# Patient Record
Sex: FEMALE | Race: WHITE | Employment: UNEMPLOYED | URBAN - METROPOLITAN AREA
[De-identification: names, ages, dates, MRNs, and addresses within clinical notes are randomized per-mention and may not be internally consistent; named-entity substitution may affect disease eponyms.]

---

## 2023-02-01 ENCOUNTER — HOSPITAL ENCOUNTER (EMERGENCY)
Age: 23
Discharge: HOME | End: 2023-02-01
Attending: EMERGENCY MEDICINE

## 2023-02-01 VITALS
RESPIRATION RATE: 18 BRPM | HEIGHT: 63 IN | TEMPERATURE: 97.8 F | OXYGEN SATURATION: 100 % | BODY MASS INDEX: 29.77 KG/M2 | HEART RATE: 73 BPM | DIASTOLIC BLOOD PRESSURE: 52 MMHG | WEIGHT: 168 LBS | SYSTOLIC BLOOD PRESSURE: 118 MMHG

## 2023-02-01 DIAGNOSIS — R19.7 NAUSEA VOMITING AND DIARRHEA: Primary | ICD-10-CM

## 2023-02-01 DIAGNOSIS — R11.2 NAUSEA VOMITING AND DIARRHEA: Primary | ICD-10-CM

## 2023-02-01 DIAGNOSIS — R10.9 ABDOMINAL PAIN, UNSPECIFIED ABDOMINAL LOCATION: ICD-10-CM

## 2023-02-01 LAB
ALBUMIN SERPL-MCNC: 4.3 G/DL (ref 3.5–4.6)
ALP BLD-CCNC: 86 U/L (ref 40–130)
ALT SERPL-CCNC: 30 U/L (ref 0–33)
AMORPHOUS: ABNORMAL
ANION GAP SERPL CALCULATED.3IONS-SCNC: 10 MEQ/L (ref 9–15)
AST SERPL-CCNC: 22 U/L (ref 0–35)
BACTERIA: ABNORMAL /HPF
BASOPHILS ABSOLUTE: 0.1 K/UL (ref 0–0.1)
BASOPHILS RELATIVE PERCENT: 0.6 % (ref 0.1–1.2)
BILIRUB SERPL-MCNC: <0.2 MG/DL (ref 0.2–0.7)
BILIRUBIN URINE: NEGATIVE
BLOOD, URINE: NEGATIVE
BUN BLDV-MCNC: 9 MG/DL (ref 6–20)
CALCIUM SERPL-MCNC: 9.3 MG/DL (ref 8.5–9.9)
CHLORIDE BLD-SCNC: 100 MEQ/L (ref 95–107)
CLARITY: NORMAL
CO2: 28 MEQ/L (ref 20–31)
COLOR: YELLOW
CREAT SERPL-MCNC: 0.66 MG/DL (ref 0.5–0.9)
EOSINOPHILS ABSOLUTE: 0.3 K/UL (ref 0–0.4)
EOSINOPHILS RELATIVE PERCENT: 3.3 % (ref 0.7–5.8)
EPITHELIAL CELLS, UA: ABNORMAL /HPF
GFR SERPL CREATININE-BSD FRML MDRD: >60 ML/MIN/{1.73_M2}
GLOBULIN: 3 G/DL (ref 2.3–3.5)
GLUCOSE BLD-MCNC: 93 MG/DL (ref 70–99)
GLUCOSE URINE: NEGATIVE MG/DL
HCG(URINE) PREGNANCY TEST: NEGATIVE
HCT VFR BLD CALC: 45.8 % (ref 37–47)
HEMOGLOBIN: 15 G/DL (ref 11.2–15.7)
IMMATURE GRANULOCYTES #: 0 K/UL
IMMATURE GRANULOCYTES %: 0.2 %
KETONES, URINE: NEGATIVE MG/DL
LEUKOCYTE ESTERASE, URINE: NORMAL
LIPASE: 25 U/L (ref 12–95)
LYMPHOCYTES ABSOLUTE: 2.9 K/UL (ref 1.2–3.7)
LYMPHOCYTES RELATIVE PERCENT: 29 %
MAGNESIUM: 1.9 MG/DL (ref 1.7–2.4)
MCH RBC QN AUTO: 28.5 PG (ref 25.6–32.2)
MCHC RBC AUTO-ENTMCNC: 32.8 % (ref 32.2–35.5)
MCV RBC AUTO: 87.1 FL (ref 79.4–94.8)
MONOCYTES ABSOLUTE: 0.5 K/UL (ref 0.2–0.9)
MONOCYTES RELATIVE PERCENT: 4.9 % (ref 4.7–12.5)
NEUTROPHILS ABSOLUTE: 6.3 K/UL (ref 1.6–6.1)
NEUTROPHILS RELATIVE PERCENT: 62 % (ref 34–71.1)
NITRITE, URINE: NEGATIVE
PDW BLD-RTO: 12.5 % (ref 11.7–14.4)
PH UA: 7.5 (ref 5–9)
PLATELET # BLD: 330 K/UL (ref 182–369)
POTASSIUM SERPL-SCNC: 3.9 MEQ/L (ref 3.4–4.9)
PROTEIN UA: NEGATIVE MG/DL
RBC # BLD: 5.26 M/UL (ref 3.93–5.22)
RBC UA: ABNORMAL /HPF (ref 0–2)
SARS-COV-2, NAAT: NOT DETECTED
SODIUM BLD-SCNC: 138 MEQ/L (ref 135–144)
SPECIFIC GRAVITY UA: 1.02 (ref 1–1.03)
TOTAL PROTEIN: 7.3 G/DL (ref 6.3–8)
TROPONIN: <0.01 NG/ML (ref 0–0.01)
UROBILINOGEN, URINE: 0.2 E.U./DL
WBC # BLD: 10.2 K/UL (ref 4–10)
WBC UA: ABNORMAL /HPF (ref 0–5)

## 2023-02-01 PROCEDURE — 84703 CHORIONIC GONADOTROPIN ASSAY: CPT

## 2023-02-01 PROCEDURE — 6360000002 HC RX W HCPCS: Performed by: EMERGENCY MEDICINE

## 2023-02-01 PROCEDURE — 2580000003 HC RX 258: Performed by: EMERGENCY MEDICINE

## 2023-02-01 PROCEDURE — 96374 THER/PROPH/DIAG INJ IV PUSH: CPT

## 2023-02-01 PROCEDURE — 36415 COLL VENOUS BLD VENIPUNCTURE: CPT

## 2023-02-01 PROCEDURE — 99284 EMERGENCY DEPT VISIT MOD MDM: CPT

## 2023-02-01 PROCEDURE — 2500000003 HC RX 250 WO HCPCS: Performed by: EMERGENCY MEDICINE

## 2023-02-01 PROCEDURE — 96375 TX/PRO/DX INJ NEW DRUG ADDON: CPT

## 2023-02-01 PROCEDURE — 83690 ASSAY OF LIPASE: CPT

## 2023-02-01 PROCEDURE — 85025 COMPLETE CBC W/AUTO DIFF WBC: CPT

## 2023-02-01 PROCEDURE — 83735 ASSAY OF MAGNESIUM: CPT

## 2023-02-01 PROCEDURE — 80053 COMPREHEN METABOLIC PANEL: CPT

## 2023-02-01 PROCEDURE — 87635 SARS-COV-2 COVID-19 AMP PRB: CPT

## 2023-02-01 PROCEDURE — 84484 ASSAY OF TROPONIN QUANT: CPT

## 2023-02-01 PROCEDURE — 81001 URINALYSIS AUTO W/SCOPE: CPT

## 2023-02-01 RX ORDER — 0.9 % SODIUM CHLORIDE 0.9 %
1000 INTRAVENOUS SOLUTION INTRAVENOUS ONCE
Status: COMPLETED | OUTPATIENT
Start: 2023-02-01 | End: 2023-02-01

## 2023-02-01 RX ORDER — KETOROLAC TROMETHAMINE 15 MG/ML
15 INJECTION, SOLUTION INTRAMUSCULAR; INTRAVENOUS ONCE
Status: COMPLETED | OUTPATIENT
Start: 2023-02-01 | End: 2023-02-01

## 2023-02-01 RX ORDER — DIPHENHYDRAMINE HYDROCHLORIDE 50 MG/ML
25 INJECTION INTRAMUSCULAR; INTRAVENOUS ONCE
Status: COMPLETED | OUTPATIENT
Start: 2023-02-01 | End: 2023-02-01

## 2023-02-01 RX ORDER — CIPROFLOXACIN 500 MG/1
500 TABLET, FILM COATED ORAL 2 TIMES DAILY
Qty: 10 TABLET | Refills: 0 | Status: SHIPPED | OUTPATIENT
Start: 2023-02-01 | End: 2023-02-06

## 2023-02-01 RX ORDER — METOCLOPRAMIDE 10 MG/1
10 TABLET ORAL 2 TIMES DAILY PRN
Qty: 60 TABLET | Refills: 0 | Status: SHIPPED | OUTPATIENT
Start: 2023-02-01

## 2023-02-01 RX ORDER — METOCLOPRAMIDE HYDROCHLORIDE 5 MG/ML
10 INJECTION INTRAMUSCULAR; INTRAVENOUS ONCE
Status: COMPLETED | OUTPATIENT
Start: 2023-02-01 | End: 2023-02-01

## 2023-02-01 RX ADMIN — Medication 20 MG: at 14:26

## 2023-02-01 RX ADMIN — SODIUM CHLORIDE 1000 ML: 9 INJECTION, SOLUTION INTRAVENOUS at 14:25

## 2023-02-01 RX ADMIN — DIPHENHYDRAMINE HYDROCHLORIDE 25 MG: 50 INJECTION, SOLUTION INTRAMUSCULAR; INTRAVENOUS at 14:27

## 2023-02-01 RX ADMIN — KETOROLAC TROMETHAMINE 15 MG: 15 INJECTION, SOLUTION INTRAMUSCULAR; INTRAVENOUS at 14:28

## 2023-02-01 RX ADMIN — METOCLOPRAMIDE 10 MG: 5 INJECTION, SOLUTION INTRAMUSCULAR; INTRAVENOUS at 14:31

## 2023-02-01 ASSESSMENT — ENCOUNTER SYMPTOMS
ABDOMINAL PAIN: 1
VOMITING: 1
COUGH: 0
DIARRHEA: 1
BACK PAIN: 0
SHORTNESS OF BREATH: 0
NAUSEA: 1
SORE THROAT: 0

## 2023-02-01 ASSESSMENT — PAIN DESCRIPTION - ORIENTATION: ORIENTATION: LOWER;MID

## 2023-02-01 ASSESSMENT — PAIN SCALES - GENERAL
PAINLEVEL_OUTOF10: 0
PAINLEVEL_OUTOF10: 4
PAINLEVEL_OUTOF10: 5

## 2023-02-01 ASSESSMENT — PAIN DESCRIPTION - LOCATION
LOCATION: ABDOMEN
LOCATION: ABDOMEN

## 2023-02-01 ASSESSMENT — PAIN - FUNCTIONAL ASSESSMENT
PAIN_FUNCTIONAL_ASSESSMENT: 0-10
PAIN_FUNCTIONAL_ASSESSMENT: 0-10

## 2023-02-01 ASSESSMENT — PAIN DESCRIPTION - DESCRIPTORS
DESCRIPTORS: CRAMPING
DESCRIPTORS: CRAMPING

## 2023-02-01 NOTE — ED PROVIDER NOTES
[facility] ED  eMERGENCYdEPARTMENT eNCOUnter      Pt Name: Kwame Starks  MRN: [id number]  [place] 2000  Date of evaluation: 2/1/2023  Tanesha Rouse MD    CHIEF COMPLAINT           HPI  Kwame Satrks is a 25 y.o. female per chart review has no pmh presents to the ED with n/v/d, ab pain. Pt notes gradual onset, mild, intermittent, aching, epigastric ab pain x 3 days. +N/v/d. Pt denies fever, cp, sob, dysuria. ROS  Review of Systems   Constitutional:  Negative for activity change, chills and fever. HENT:  Negative for ear pain and sore throat. Eyes:  Negative for visual disturbance. Respiratory:  Negative for cough and shortness of breath. Cardiovascular:  Negative for chest pain, palpitations and leg swelling. Gastrointestinal:  Positive for abdominal pain, diarrhea, nausea and vomiting. Genitourinary:  Negative for dysuria. Musculoskeletal:  Negative for back pain. Skin:  Negative for rash. Neurological:  Negative for dizziness and weakness. Except as noted above the remainder of the review of systems was reviewed and negative. PAST MEDICAL HISTORY     Past Medical History:   Diagnosis Date    Stomach ulcer     UTI (urinary tract infection)          SURGICAL HISTORY     No past surgical history on file. CURRENTMEDICATIONS       Previous Medications    No medications on file       ALLERGIES     Blueberry    FAMILY HISTORY     No family history on file. SOCIAL HISTORY       Social History     Socioeconomic History    Marital status: Single   Tobacco Use    Smoking status: Every Day     Types: Cigarettes    Smokeless tobacco: Never   Substance and Sexual Activity    Alcohol use: Not Currently         PHYSICAL EXAM       ED Triage Vitals [02/01/23 1406]   BP Temp Temp Source Heart Rate Resp SpO2 Height Weight   -- 97.8 °F (36.6 °C) Temporal 83 20 99 % 5' 3\" (1.6 m) 168 lb (76.2 kg)       Physical Exam  Vitals and nursing note reviewed. Constitutional:       Appearance: She is well-developed. HENT:      Head: Normocephalic. Right Ear: External ear normal.      Left Ear: External ear normal.   Eyes:      Conjunctiva/sclera: Conjunctivae normal.      Pupils: Pupils are equal, round, and reactive to light. Cardiovascular:      Rate and Rhythm: Normal rate and regular rhythm. Heart sounds: Normal heart sounds. Pulmonary:      Effort: Pulmonary effort is normal.      Breath sounds: Normal breath sounds. Abdominal:      General: Bowel sounds are normal. There is no distension. Palpations: Abdomen is soft. Tenderness: There is no abdominal tenderness. Musculoskeletal:         General: Normal range of motion. Cervical back: Normal range of motion and neck supple. Skin:     General: Skin is warm and dry. Neurological:      Mental Status: She is alert and oriented to person, place, and time. Psychiatric:         Mood and Affect: Mood normal.         MDM  24 yo female presents to the ED with ab pain, n/v/d. Pt is afebrile, hemodynamically stable. Pt given 1  L NS, IV reglan, IV benadryl, IV pepcid, IV toradol in the ED. Labs remarkable for WBC 10.  Pt reassessed and feels much better. Covid, flu negative. UA, hcg negative. Pt given prescription for reglan, cipro. Will treat for bacterial gastroenteritis. Pt given ab pain, n/v/d warning signs and will f/uw with pcp. FINAL IMPRESSION      1. Nausea vomiting and diarrhea    2.  Abdominal pain, unspecified abdominal location          DISPOSITION/PLAN   DISPOSITION Decision To Discharge 02/01/2023 03:11:53 PM        DISCHARGE MEDICATIONS:  [unfilled]         Gerry Bolton MD(electronically signed)  Attending Emergency Physician            Gerry Bolton MD  02/01/23 7350

## 2023-05-19 ENCOUNTER — HOSPITAL ENCOUNTER (EMERGENCY)
Age: 23
Discharge: HOME | End: 2023-05-19
Attending: EMERGENCY MEDICINE
Payer: COMMERCIAL

## 2023-05-19 VITALS
DIASTOLIC BLOOD PRESSURE: 75 MMHG | HEIGHT: 63 IN | BODY MASS INDEX: 29.23 KG/M2 | TEMPERATURE: 97.6 F | RESPIRATION RATE: 16 BRPM | SYSTOLIC BLOOD PRESSURE: 132 MMHG | HEART RATE: 97 BPM | WEIGHT: 165 LBS | OXYGEN SATURATION: 98 %

## 2023-05-19 DIAGNOSIS — R09.81 NASAL CONGESTION: Primary | ICD-10-CM

## 2023-05-19 DIAGNOSIS — H92.02 OTALGIA OF LEFT EAR: ICD-10-CM

## 2023-05-19 PROCEDURE — 99283 EMERGENCY DEPT VISIT LOW MDM: CPT

## 2023-05-19 RX ORDER — AZITHROMYCIN 250 MG/1
TABLET, FILM COATED ORAL
Qty: 1 PACKET | Refills: 0 | Status: SHIPPED | OUTPATIENT
Start: 2023-05-19 | End: 2023-05-23

## 2023-05-19 RX ORDER — GUAIFENESIN AND DEXTROMETHORPHAN HYDROBROMIDE 1200; 60 MG/1; MG/1
1 TABLET, EXTENDED RELEASE ORAL 2 TIMES DAILY
Qty: 20 TABLET | Refills: 0 | Status: SHIPPED | OUTPATIENT
Start: 2023-05-19

## 2023-05-19 ASSESSMENT — ENCOUNTER SYMPTOMS
TROUBLE SWALLOWING: 0
EYE REDNESS: 0
FACIAL SWELLING: 0
CHOKING: 0
SHORTNESS OF BREATH: 0
ABDOMINAL PAIN: 0
CONSTIPATION: 0
VOMITING: 0
WHEEZING: 0
STRIDOR: 0
DIARRHEA: 0
BACK PAIN: 0
CHEST TIGHTNESS: 0
SINUS PRESSURE: 1
SORE THROAT: 0
EYE DISCHARGE: 0
SINUS PAIN: 1
BLOOD IN STOOL: 0
COUGH: 1
EYE PAIN: 0
VOICE CHANGE: 0

## 2023-05-19 NOTE — ED PROVIDER NOTES
[facility] ED  eMERGENCY dEPARTMENT eNCOUnter    Name: Pedrito Moreland  MRN: [id number]  [place] 2000  Date of evaluation: 5/19/2023  Provider: Bryce Lui MD    [address] [place] [address]       Chief Complaint   Patient presents with    Cough    Nasal Congestion    Ear Fullness     TORY OF PRESENT ILLNESS   (Location/Symptom, Timing/Onset,Context/Setting, Quality, Duration, Modifying Factors, Severity)  Note limiting factors. Pedrito Moreland is a 25 y.o. female who presents to the emergency department patient come to the respiratory illness for the last 3 to 4 days time getting worse nasal congestion sinus drainage pressure in the ear along with cold cough congestion no one sick at home at this time patient denies short of breath or chest pain smoker patient admits to using a Q-tip few days ago no bleeding hearing slightly muffled very sick at home with respiratory illness at home as per   HPI    NursingNotes were reviewed. REVIEW OF SYSTEMS    (2-9 systems for level 4, 10 or more for level 5)     Review of Systems   Constitutional:  Positive for appetite change, chills and diaphoresis. Negative for activity change and fever. HENT:  Positive for congestion, ear pain, hearing loss, postnasal drip, sinus pressure and sinus pain. Negative for drooling, facial swelling, mouth sores, nosebleeds, sore throat, trouble swallowing and voice change. Eyes:  Negative for pain, discharge, redness and visual disturbance. Respiratory:  Positive for cough. Negative for choking, chest tightness, shortness of breath, wheezing and stridor. Cardiovascular:  Negative for chest pain, palpitations and leg swelling. Gastrointestinal:  Negative for abdominal pain, blood in stool, constipation, diarrhea and vomiting. Endocrine: Negative for cold intolerance, polyphagia and polyuria. Genitourinary:  Negative for dysuria, flank pain, frequency, genital sores and urgency.    Musculoskeletal:  Negative for back

## 2023-05-19 NOTE — ED TRIAGE NOTES
Pt a/ox3 skinw d intact  lung sounds clear pulse stromng and regular  pt has dry non productive cough  with nasal congestion  no distress noted

## 2023-10-15 ENCOUNTER — APPOINTMENT (OUTPATIENT)
Dept: GENERAL RADIOLOGY | Age: 23
End: 2023-10-15

## 2023-10-15 ENCOUNTER — HOSPITAL ENCOUNTER (EMERGENCY)
Age: 23
Discharge: HOME | End: 2023-10-15

## 2023-10-15 VITALS
DIASTOLIC BLOOD PRESSURE: 58 MMHG | SYSTOLIC BLOOD PRESSURE: 119 MMHG | RESPIRATION RATE: 18 BRPM | OXYGEN SATURATION: 98 % | HEART RATE: 65 BPM

## 2023-10-15 DIAGNOSIS — M54.2 NECK PAIN: Primary | ICD-10-CM

## 2023-10-15 DIAGNOSIS — M43.6 TORTICOLLIS: ICD-10-CM

## 2023-10-15 PROCEDURE — 99283 EMERGENCY DEPT VISIT LOW MDM: CPT

## 2023-10-15 PROCEDURE — 72050 X-RAY EXAM NECK SPINE 4/5VWS: CPT

## 2023-10-15 PROCEDURE — 6370000000 HC RX 637 (ALT 250 FOR IP): Performed by: NURSE PRACTITIONER

## 2023-10-15 RX ORDER — OLANZAPINE 2.5 MG/1
2.5 TABLET ORAL NIGHTLY
COMMUNITY

## 2023-10-15 RX ORDER — OXYCODONE HYDROCHLORIDE AND ACETAMINOPHEN 5; 325 MG/1; MG/1
1 TABLET ORAL ONCE
Status: COMPLETED | OUTPATIENT
Start: 2023-10-15 | End: 2023-10-15

## 2023-10-15 RX ORDER — CYCLOBENZAPRINE HCL 10 MG
10 TABLET ORAL 3 TIMES DAILY PRN
Qty: 21 TABLET | Refills: 0 | Status: SHIPPED | OUTPATIENT
Start: 2023-10-15 | End: 2023-10-25

## 2023-10-15 RX ORDER — CYCLOBENZAPRINE HCL 10 MG
10 TABLET ORAL ONCE
Status: COMPLETED | OUTPATIENT
Start: 2023-10-15 | End: 2023-10-15

## 2023-10-15 RX ORDER — HYDROCODONE BITARTRATE AND ACETAMINOPHEN 5; 325 MG/1; MG/1
1 TABLET ORAL EVERY 4 HOURS PRN
Qty: 18 TABLET | Refills: 0 | Status: SHIPPED | OUTPATIENT
Start: 2023-10-15 | End: 2023-10-18

## 2023-10-15 RX ORDER — FLUOXETINE HYDROCHLORIDE 20 MG/1
60 CAPSULE ORAL DAILY
COMMUNITY

## 2023-10-15 RX ADMIN — OXYCODONE AND ACETAMINOPHEN 1 TABLET: 5; 325 TABLET ORAL at 16:08

## 2023-10-15 RX ADMIN — CYCLOBENZAPRINE 10 MG: 10 TABLET, FILM COATED ORAL at 16:08

## 2023-10-15 ASSESSMENT — ENCOUNTER SYMPTOMS
VOMITING: 0
FACIAL SWELLING: 0
DIARRHEA: 0
SINUS PAIN: 0
EYE ITCHING: 0
EYE PAIN: 0
COLOR CHANGE: 0
ALLERGIC/IMMUNOLOGIC NEGATIVE: 1
NAUSEA: 0
SORE THROAT: 0
EYE DISCHARGE: 0
APNEA: 0
ABDOMINAL DISTENTION: 0
EYE REDNESS: 0
CONSTIPATION: 0
COUGH: 0
BLOOD IN STOOL: 0
PHOTOPHOBIA: 0
CHEST TIGHTNESS: 0
WHEEZING: 0
ABDOMINAL PAIN: 0
VOICE CHANGE: 0
STRIDOR: 0
TROUBLE SWALLOWING: 0
CHOKING: 0
BACK PAIN: 0
SINUS PRESSURE: 0
SHORTNESS OF BREATH: 0
RHINORRHEA: 0

## 2023-10-15 NOTE — ED PROVIDER NOTES
[facility] ED  EMERGENCY DEPARTMENT ENCOUNTER      Pt Name: Catherine Espana  MRN: [id number]  [address] [facility] 2000  Date of evaluation: 10/15/2023  Provider: MATHEUS Lund CNP    CHIEF COMPLAINT       Chief Complaint   Patient presents with    Neck Injury     Turned head too quickly about 1-2 hours ago, neck pain on right side and tingling sensation down right shoulder and arm         HISTORY OF PRESENT ILLNESS   (Location/Symptom, Timing/Onset, Context/Setting, Quality, Duration, Modifying Factors, Severity)  Note limiting factors. Catherine Espana is a 25 y.o. female who, per chart review, has past medical history of asthma, stomach ulcer, UTIs presents to the emergency department with c/o sudden onset, constant, severe, aching, shooting pain in R side of her neck x 2 hours. Pt reports she turned her head quickly to look at something and immediately felt pain in the R side of her neck. States pain is at the base of her neck and radiates to R shoulder. Pt denies chest pain, shortness of breath, palpitations, fevers, abdominal pain, nausea, vomiting, diarrhea, dysuria, hematuria, flank pain, incontinence. Nursing Notes were reviewed. REVIEW OF SYSTEMS    (2-9 systems for level 4, 10 or more for level 5)     Review of Systems   Constitutional:  Negative for chills, diaphoresis, fatigue and fever. HENT:  Negative for congestion, dental problem, drooling, ear discharge, ear pain, facial swelling, hearing loss, mouth sores, nosebleeds, postnasal drip, rhinorrhea, sinus pressure, sinus pain, sneezing, sore throat, tinnitus, trouble swallowing and voice change. Eyes:  Negative for photophobia, pain, discharge, redness, itching and visual disturbance. Respiratory:  Negative for apnea, cough, choking, chest tightness, shortness of breath, wheezing and stridor. Cardiovascular:  Negative for chest pain, palpitations and leg swelling.    Gastrointestinal:  Negative for abdominal

## 2023-11-14 ENCOUNTER — HOSPITAL ENCOUNTER (EMERGENCY)
Age: 23
Discharge: HOME | End: 2023-11-14

## 2023-11-14 VITALS
HEART RATE: 84 BPM | BODY MASS INDEX: 30.11 KG/M2 | HEIGHT: 64 IN | RESPIRATION RATE: 20 BRPM | TEMPERATURE: 98.3 F | DIASTOLIC BLOOD PRESSURE: 75 MMHG | SYSTOLIC BLOOD PRESSURE: 141 MMHG | OXYGEN SATURATION: 99 % | WEIGHT: 176.37 LBS

## 2023-11-14 DIAGNOSIS — Z32.01 POSITIVE URINE PREGNANCY TEST: Primary | ICD-10-CM

## 2023-11-14 LAB
BILIRUB UR QL STRIP: NEGATIVE
CLARITY UR: NORMAL
COLOR UR: YELLOW
GLUCOSE UR STRIP-MCNC: NEGATIVE MG/DL
HCG UR QL: POSITIVE
HGB UR QL STRIP: NEGATIVE
KETONES UR STRIP-MCNC: NORMAL MG/DL
LEUKOCYTE ESTERASE UR QL STRIP: NEGATIVE
NITRITE UR QL STRIP: NEGATIVE
PH UR STRIP: 6.5 [PH] (ref 5–9)
PROT UR STRIP-MCNC: NORMAL MG/DL
SP GR UR STRIP: 1.02 (ref 1–1.03)
URINE REFLEX TO CULTURE: NORMAL
UROBILINOGEN UR STRIP-ACNC: 0.2 E.U./DL

## 2023-11-14 PROCEDURE — 81003 URINALYSIS AUTO W/O SCOPE: CPT

## 2023-11-14 PROCEDURE — 99283 EMERGENCY DEPT VISIT LOW MDM: CPT

## 2023-11-14 PROCEDURE — 84703 CHORIONIC GONADOTROPIN ASSAY: CPT

## 2023-11-14 ASSESSMENT — LIFESTYLE VARIABLES
HOW MANY STANDARD DRINKS CONTAINING ALCOHOL DO YOU HAVE ON A TYPICAL DAY: PATIENT DOES NOT DRINK
HOW OFTEN DO YOU HAVE A DRINK CONTAINING ALCOHOL: NEVER

## 2023-11-14 ASSESSMENT — ENCOUNTER SYMPTOMS
COUGH: 0
SORE THROAT: 0
ABDOMINAL PAIN: 0
BACK PAIN: 0
SHORTNESS OF BREATH: 0
DIARRHEA: 0
NAUSEA: 0
VOMITING: 0

## 2023-11-14 ASSESSMENT — PAIN - FUNCTIONAL ASSESSMENT
PAIN_FUNCTIONAL_ASSESSMENT: NONE - DENIES PAIN
PAIN_FUNCTIONAL_ASSESSMENT: NONE - DENIES PAIN

## 2023-11-14 NOTE — ED TRIAGE NOTES
Patient in and states she wants  a pregnacy test and states she took 3 at home that state she is pregnant

## 2023-11-14 NOTE — ED PROVIDER NOTES
[facility] ED  eMERGENCYdEPARTMENT eNCOUnter      Pt Name: Davie Yoo  MRN: [id number]  [facility] 2000[facility] evaluation: 11/14/2023  Provider:MATHEUS Da Silva CNP    CHIEF COMPLAINT       Chief Complaint   Patient presents with    Pregnancy Test     Just tested today, tested positive 3 times on at home tests. HISTORY OF PRESENT ILLNESS  (Location/Symptom, Timing/Onset, Context/Setting, Quality, Duration, Modifying Factors, Severity.)   Davie Yoo is a 25 y.o. female hx of UTI, Asthma,  who presents to the emergency department for pregnancy test.  Presents to the emergency department with concern for pregnancy. Her last period was 1 month ago and she states she has regular periods every month. She states she was due to start her period this week so she took a home pregnancy test it was positive so she took 2 more that were also positive. She presents to emergency department for confirmation of pregnancy. This would be her first pregnancy. She denies any pelvic pain discharge or vaginal bleeding. She denies any chest pain, shortness of breath, abdominal pain, nausea, vomiting, diarrhea, fever, chills, headache, or recent illness. She is not on any form of birth control. HPI    Nursing Notes were reviewed and I agree. REVIEW OF SYSTEMS    (2-9 systems for level 4, 10 or more for level 5)     Review of Systems   Constitutional:  Negative for activity change, chills and fever. HENT:  Negative for ear pain and sore throat. Eyes:  Negative for visual disturbance. Respiratory:  Negative for cough and shortness of breath. Cardiovascular:  Negative for chest pain, palpitations and leg swelling. Gastrointestinal:  Negative for abdominal pain, diarrhea, nausea and vomiting. Genitourinary:  Negative for dysuria. Musculoskeletal:  Negative for back pain. Skin:  Negative for rash. Neurological:  Negative for dizziness and weakness.         as noted above the

## 2023-11-14 NOTE — DISCHARGE INSTRUCTIONS
Call your prescribing doctor to discuss your Zyprexa and Prozac medication as you tested positive for pregnancy today. Please schedule an OB/GYN appointment referral phone number given above. Start taking prenatal vitamin. Return to emergency department for any new or worsening symptoms.

## 2023-11-28 ENCOUNTER — APPOINTMENT (OUTPATIENT)
Dept: ULTRASOUND IMAGING | Age: 23
End: 2023-11-28

## 2023-11-28 ENCOUNTER — HOSPITAL ENCOUNTER (EMERGENCY)
Age: 23
Discharge: HOME | End: 2023-11-28
Attending: EMERGENCY MEDICINE

## 2023-11-28 VITALS
BODY MASS INDEX: 31.01 KG/M2 | RESPIRATION RATE: 17 BRPM | TEMPERATURE: 98.2 F | OXYGEN SATURATION: 98 % | HEART RATE: 64 BPM | DIASTOLIC BLOOD PRESSURE: 64 MMHG | WEIGHT: 175 LBS | SYSTOLIC BLOOD PRESSURE: 128 MMHG | HEIGHT: 63 IN

## 2023-11-28 DIAGNOSIS — O21.0 MILD HYPEREMESIS GRAVIDARUM, ANTEPARTUM: Primary | ICD-10-CM

## 2023-11-28 DIAGNOSIS — O23.41 UTI IN PREGNANCY, ANTEPARTUM, FIRST TRIMESTER: ICD-10-CM

## 2023-11-28 LAB
ALBUMIN SERPL-MCNC: 4.4 G/DL (ref 3.5–4.6)
ALP SERPL-CCNC: 100 U/L (ref 40–130)
ALT SERPL-CCNC: 19 U/L (ref 0–33)
ANION GAP SERPL CALCULATED.3IONS-SCNC: 13 MEQ/L (ref 9–15)
AST SERPL-CCNC: 15 U/L (ref 0–35)
BACTERIA URNS QL MICRO: ABNORMAL /HPF
BASOPHILS # BLD: 0.1 K/UL (ref 0–0.1)
BASOPHILS NFR BLD: 0.3 % (ref 0.1–1.2)
BILIRUB SERPL-MCNC: <0.2 MG/DL (ref 0.2–0.7)
BILIRUB UR QL STRIP: ABNORMAL
BUN SERPL-MCNC: 6 MG/DL (ref 6–20)
CALCIUM SERPL-MCNC: 9.9 MG/DL (ref 8.5–9.9)
CHLORIDE SERPL-SCNC: 104 MEQ/L (ref 95–107)
CLARITY UR: CLEAR
CO2 SERPL-SCNC: 20 MEQ/L (ref 20–31)
COLOR UR: YELLOW
CREAT SERPL-MCNC: 0.54 MG/DL (ref 0.5–0.9)
EOSINOPHIL # BLD: 0.1 K/UL (ref 0–0.4)
EOSINOPHIL NFR BLD: 0.4 % (ref 0.7–5.8)
EPI CELLS #/AREA URNS HPF: ABNORMAL /HPF
ERYTHROCYTE [DISTWIDTH] IN BLOOD BY AUTOMATED COUNT: 13.1 % (ref 11.7–14.4)
GLOBULIN SER CALC-MCNC: 3.4 G/DL (ref 2.3–3.5)
GLUCOSE SERPL-MCNC: 101 MG/DL (ref 70–99)
GLUCOSE UR STRIP-MCNC: NEGATIVE MG/DL
GONADOTROPIN, CHORIONIC (HCG) QUANT: NORMAL MIU/ML
HCG UR QL: POSITIVE
HCT VFR BLD AUTO: 42.9 % (ref 37–47)
HGB BLD-MCNC: 14.1 G/DL (ref 11.2–15.7)
HGB UR QL STRIP: NEGATIVE
IMM GRANULOCYTES # BLD: 0 K/UL
IMM GRANULOCYTES NFR BLD: 0.3 %
INFLUENZA A BY PCR: NEGATIVE
INFLUENZA B BY PCR: NEGATIVE
KETONES UR STRIP-MCNC: 80 MG/DL
LEUKOCYTE ESTERASE UR QL STRIP: NEGATIVE
LIPASE SERPL-CCNC: 17 U/L (ref 12–95)
LYMPHOCYTES # BLD: 2.4 K/UL (ref 1.2–3.7)
LYMPHOCYTES NFR BLD: 15.9 %
MCH RBC QN AUTO: 28.7 PG (ref 25.6–32.2)
MCHC RBC AUTO-ENTMCNC: 32.9 % (ref 32.2–35.5)
MCV RBC AUTO: 87.4 FL (ref 79.4–94.8)
MONOCYTES # BLD: 0.6 K/UL (ref 0.2–0.9)
MONOCYTES NFR BLD: 4.3 % (ref 4.7–12.5)
MUCOUS THREADS URNS QL MICRO: PRESENT /LPF
NEUTROPHILS # BLD: 11.6 K/UL (ref 1.6–6.1)
NEUTS SEG NFR BLD: 78.8 % (ref 34–71.1)
NITRITE UR QL STRIP: NEGATIVE
PH UR STRIP: 6.5 [PH] (ref 5–9)
PLATELET # BLD AUTO: 309 K/UL (ref 182–369)
POTASSIUM SERPL-SCNC: 3.9 MEQ/L (ref 3.4–4.9)
PROT SERPL-MCNC: 7.8 G/DL (ref 6.3–8)
PROT UR STRIP-MCNC: 100 MG/DL
RBC # BLD AUTO: 4.91 M/UL (ref 3.93–5.22)
RBC #/AREA URNS HPF: ABNORMAL /HPF (ref 0–2)
SARS-COV-2 RDRP RESP QL NAA+PROBE: NOT DETECTED
SODIUM SERPL-SCNC: 137 MEQ/L (ref 135–144)
SP GR UR STRIP: >=1.03 (ref 1–1.03)
UROBILINOGEN UR STRIP-ACNC: 0.2 E.U./DL
WBC # BLD AUTO: 14.8 K/UL (ref 4–10)
WBC #/AREA URNS HPF: ABNORMAL /HPF (ref 0–5)

## 2023-11-28 PROCEDURE — 36415 COLL VENOUS BLD VENIPUNCTURE: CPT

## 2023-11-28 PROCEDURE — 83690 ASSAY OF LIPASE: CPT

## 2023-11-28 PROCEDURE — 87502 INFLUENZA DNA AMP PROBE: CPT

## 2023-11-28 PROCEDURE — 87086 URINE CULTURE/COLONY COUNT: CPT

## 2023-11-28 PROCEDURE — 80053 COMPREHEN METABOLIC PANEL: CPT

## 2023-11-28 PROCEDURE — 84702 CHORIONIC GONADOTROPIN TEST: CPT

## 2023-11-28 PROCEDURE — 85025 COMPLETE CBC W/AUTO DIFF WBC: CPT

## 2023-11-28 PROCEDURE — 6370000000 HC RX 637 (ALT 250 FOR IP): Performed by: EMERGENCY MEDICINE

## 2023-11-28 PROCEDURE — 99284 EMERGENCY DEPT VISIT MOD MDM: CPT

## 2023-11-28 PROCEDURE — 2580000003 HC RX 258: Performed by: EMERGENCY MEDICINE

## 2023-11-28 PROCEDURE — 84703 CHORIONIC GONADOTROPIN ASSAY: CPT

## 2023-11-28 PROCEDURE — 81001 URINALYSIS AUTO W/SCOPE: CPT

## 2023-11-28 PROCEDURE — 87635 SARS-COV-2 COVID-19 AMP PRB: CPT

## 2023-11-28 PROCEDURE — 76817 TRANSVAGINAL US OBSTETRIC: CPT

## 2023-11-28 RX ORDER — CEPHALEXIN 500 MG/1
500 CAPSULE ORAL 4 TIMES DAILY
Qty: 40 CAPSULE | Refills: 0 | Status: SHIPPED | OUTPATIENT
Start: 2023-11-28 | End: 2023-12-08

## 2023-11-28 RX ORDER — ONDANSETRON 4 MG/1
4 TABLET, FILM COATED ORAL EVERY 8 HOURS PRN
Qty: 20 TABLET | Refills: 0 | Status: SHIPPED | OUTPATIENT
Start: 2023-11-28

## 2023-11-28 RX ORDER — ONDANSETRON 4 MG/1
4 TABLET, ORALLY DISINTEGRATING ORAL ONCE
Status: COMPLETED | OUTPATIENT
Start: 2023-11-28 | End: 2023-11-28

## 2023-11-28 RX ORDER — ACETAMINOPHEN 500 MG
1000 TABLET ORAL
Status: COMPLETED | OUTPATIENT
Start: 2023-11-28 | End: 2023-11-28

## 2023-11-28 RX ORDER — ACETAMINOPHEN 500 MG
500 TABLET ORAL 4 TIMES DAILY PRN
Qty: 50 TABLET | Refills: 0 | Status: SHIPPED | OUTPATIENT
Start: 2023-11-28

## 2023-11-28 RX ORDER — 0.9 % SODIUM CHLORIDE 0.9 %
1000 INTRAVENOUS SOLUTION INTRAVENOUS ONCE
Status: COMPLETED | OUTPATIENT
Start: 2023-11-28 | End: 2023-11-28

## 2023-11-28 RX ADMIN — ACETAMINOPHEN 1000 MG: 500 TABLET ORAL at 19:15

## 2023-11-28 RX ADMIN — ONDANSETRON 4 MG: 4 TABLET, ORALLY DISINTEGRATING ORAL at 19:16

## 2023-11-28 RX ADMIN — SODIUM CHLORIDE 1000 ML: 9 INJECTION, SOLUTION INTRAVENOUS at 19:45

## 2023-11-28 ASSESSMENT — ENCOUNTER SYMPTOMS
CONSTIPATION: 0
BACK PAIN: 0
ABDOMINAL PAIN: 1
RHINORRHEA: 0
APNEA: 0
COUGH: 0
EYE PAIN: 0
VOMITING: 1
SORE THROAT: 0
SHORTNESS OF BREATH: 0
SINUS PRESSURE: 0
WHEEZING: 0
DIARRHEA: 0
NAUSEA: 1
COLOR CHANGE: 0
ABDOMINAL DISTENTION: 0
PHOTOPHOBIA: 0

## 2023-11-28 ASSESSMENT — PAIN - FUNCTIONAL ASSESSMENT: PAIN_FUNCTIONAL_ASSESSMENT: 0-10

## 2023-11-28 ASSESSMENT — PAIN SCALES - GENERAL
PAINLEVEL_OUTOF10: 2
PAINLEVEL_OUTOF10: 4

## 2023-11-28 ASSESSMENT — PAIN DESCRIPTION - PAIN TYPE: TYPE: ACUTE PAIN

## 2023-11-28 ASSESSMENT — PAIN DESCRIPTION - DESCRIPTORS
DESCRIPTORS: ACHING
DESCRIPTORS: SORE

## 2023-11-28 ASSESSMENT — PAIN DESCRIPTION - LOCATION
LOCATION: ABDOMEN
LOCATION: ABDOMEN

## 2023-11-29 NOTE — ED PROVIDER NOTES
[facility] ED  eMERGENCY dEPARTMENT eNCOUnter      Pt Name: Cherelle Clancy  MRN: [id number]  [address] [facility] [place] 2000  Date of evaluation: 11/28/2023  Provider: Sarita Estrada MD    CHIEF COMPLAINT       Chief Complaint   Patient presents with    Emesis     Nausea/vomiting, a few weeks pregnant. Sharp lower abd pain while vomiting. HISTORY OF PRESENT ILLNESS   (Location/Symptom, Timing/Onset,Context/Setting, Quality, Duration, Modifying Factors, Severity)  Note limiting factors. Cherelle Clancy is a 25 y.o. female who presents to the emergency department with complaint of nausea and vomiting of 3 days duration. Also lower abdominal pain with episodes of emesis. This is her second pregnancy. She is 5 weeks pregnant by home pregnancy test.  She had a miscarriage that was ectopic last year. Denies fever or chills. Denies dysuria. Denies any other systemic symptoms. HPI    Nursing Notes were reviewed. REVIEW OF SYSTEMS    (2-9 systems for level 4, 10 or more for level 5)     Review of Systems   Constitutional: Negative. Negative for activity change, appetite change, chills, fatigue and fever. HENT:  Negative for congestion, ear discharge, ear pain, hearing loss, rhinorrhea, sinus pressure and sore throat. Eyes:  Negative for photophobia, pain and visual disturbance. Respiratory:  Negative for apnea, cough, shortness of breath and wheezing. Cardiovascular:  Negative for chest pain, palpitations and leg swelling. Gastrointestinal:  Positive for abdominal pain, nausea and vomiting. Negative for abdominal distention, constipation and diarrhea. Endocrine: Negative for cold intolerance, heat intolerance and polyuria. Genitourinary:  Negative for dysuria, flank pain, frequency and urgency. Musculoskeletal:  Negative for arthralgias, back pain, gait problem, myalgias and neck stiffness. Skin:  Negative for color change, pallor and rash.    Allergic/Immunologic: Negative for food

## 2023-11-29 NOTE — ED TRIAGE NOTES
Pt presents to ER with c/o 5-6 weeks pregnant with vomiting over the past few days with lower abdominal pain. Pt states she does not have an OB at this time.  LMP 10/11/23

## 2023-11-30 LAB — BACTERIA UR CULT: NORMAL

## 2024-01-26 ENCOUNTER — APPOINTMENT (OUTPATIENT)
Dept: CT IMAGING | Age: 24
End: 2024-01-26
Payer: MEDICAID

## 2024-01-26 ENCOUNTER — HOSPITAL ENCOUNTER (EMERGENCY)
Age: 24
Discharge: HOME | End: 2024-01-27
Attending: EMERGENCY MEDICINE
Payer: MEDICAID

## 2024-01-26 DIAGNOSIS — M54.2 NECK PAIN: Primary | ICD-10-CM

## 2024-01-26 PROCEDURE — 99285 EMERGENCY DEPT VISIT HI MDM: CPT

## 2024-01-26 PROCEDURE — 81001 URINALYSIS AUTO W/SCOPE: CPT

## 2024-01-26 PROCEDURE — 72125 CT NECK SPINE W/O DYE: CPT

## 2024-01-26 PROCEDURE — 6360000004 HC RX CONTRAST MEDICATION: Performed by: EMERGENCY MEDICINE

## 2024-01-26 PROCEDURE — 70491 CT SOFT TISSUE NECK W/DYE: CPT

## 2024-01-26 PROCEDURE — 36415 COLL VENOUS BLD VENIPUNCTURE: CPT

## 2024-01-26 PROCEDURE — 85025 COMPLETE CBC W/AUTO DIFF WBC: CPT

## 2024-01-26 PROCEDURE — 2580000003 HC RX 258: Performed by: EMERGENCY MEDICINE

## 2024-01-26 PROCEDURE — 80048 BASIC METABOLIC PNL TOTAL CA: CPT

## 2024-01-26 RX ORDER — 0.9 % SODIUM CHLORIDE 0.9 %
1000 INTRAVENOUS SOLUTION INTRAVENOUS ONCE
Status: COMPLETED | OUTPATIENT
Start: 2024-01-26 | End: 2024-01-27

## 2024-01-26 RX ADMIN — SODIUM CHLORIDE 1000 ML: 9 INJECTION, SOLUTION INTRAVENOUS at 23:53

## 2024-01-26 ASSESSMENT — PAIN - FUNCTIONAL ASSESSMENT: PAIN_FUNCTIONAL_ASSESSMENT: NONE - DENIES PAIN

## 2024-01-27 VITALS
DIASTOLIC BLOOD PRESSURE: 59 MMHG | HEART RATE: 77 BPM | WEIGHT: 165 LBS | OXYGEN SATURATION: 97 % | TEMPERATURE: 98 F | RESPIRATION RATE: 16 BRPM | BODY MASS INDEX: 29.23 KG/M2 | SYSTOLIC BLOOD PRESSURE: 97 MMHG | HEIGHT: 63 IN

## 2024-01-27 LAB
ANION GAP SERPL CALCULATED.3IONS-SCNC: 11 MEQ/L (ref 9–15)
BASE EXCESS VENOUS: -4 (ref -3–3)
BASOPHILS # BLD: 0 K/UL (ref 0–0.2)
BASOPHILS NFR BLD: 0.3 %
BILIRUB UR QL STRIP: NEGATIVE
BUN SERPL-MCNC: 8 MG/DL (ref 6–20)
CALCIUM IONIZED: 1.18 MMOL/L (ref 1.12–1.32)
CALCIUM SERPL-MCNC: 9 MG/DL (ref 8.5–9.9)
CHLORIDE SERPL-SCNC: 104 MEQ/L (ref 95–107)
CLARITY UR: ABNORMAL
CO2 SERPL-SCNC: 20 MEQ/L (ref 20–31)
COLOR UR: YELLOW
CREAT SERPL-MCNC: 0.58 MG/DL (ref 0.5–0.9)
CRYSTALS URNS MICRO: ABNORMAL /HPF
EOSINOPHIL # BLD: 0.2 K/UL (ref 0–0.7)
EOSINOPHIL NFR BLD: 2.5 %
ERYTHROCYTE [DISTWIDTH] IN BLOOD BY AUTOMATED COUNT: 13.2 % (ref 11.5–14.5)
GLUCOSE BLD-MCNC: 84 MG/DL (ref 70–99)
GLUCOSE SERPL-MCNC: 94 MG/DL (ref 70–99)
GLUCOSE UR STRIP-MCNC: NEGATIVE MG/DL
HCO3 VENOUS: 20.6 MMOL/L (ref 23–29)
HCT VFR BLD AUTO: 32 % (ref 36–48)
HCT VFR BLD AUTO: 34.4 % (ref 37–47)
HGB BLD CALC-MCNC: 11 GM/DL (ref 12–16)
HGB BLD-MCNC: 11.5 G/DL (ref 12–16)
HGB UR QL STRIP: NEGATIVE
HYALINE CASTS #/AREA URNS LPF: ABNORMAL /LPF (ref 0–5)
KETONES UR STRIP-MCNC: ABNORMAL MG/DL
LEUKOCYTE ESTERASE UR QL STRIP: NEGATIVE
LYMPHOCYTES # BLD: 2.9 K/UL (ref 1–4.8)
LYMPHOCYTES NFR BLD: 29.8 %
MCH RBC QN AUTO: 29.1 PG (ref 27–31.3)
MCHC RBC AUTO-ENTMCNC: 33.4 % (ref 33–37)
MCV RBC AUTO: 87.1 FL (ref 79.4–94.8)
MONOCYTES # BLD: 0.4 K/UL (ref 0.2–0.8)
MONOCYTES NFR BLD: 4.6 %
NEUTROPHILS # BLD: 6 K/UL (ref 1.4–6.5)
NEUTS SEG NFR BLD: 62.5 %
NITRITE UR QL STRIP: NEGATIVE
O2 SAT, VEN: 85 %
PCO2, VEN: 32 MM HG (ref 40–50)
PERFORMED ON: ABNORMAL
PH UR STRIP: 6 [PH] (ref 5–9)
PH VENOUS: 7.42 (ref 7.32–7.42)
PLATELET # BLD AUTO: 242 K/UL (ref 130–400)
PO2, VEN: 49 MM HG
POC CHLORIDE: 107 MEQ/L (ref 99–110)
POC CREATININE: 0.5 MG/DL (ref 0.6–1.2)
POC SAMPLE TYPE: ABNORMAL
POTASSIUM SERPL-SCNC: 3.8 MEQ/L (ref 3.4–4.9)
POTASSIUM SERPL-SCNC: 3.8 MEQ/L (ref 3.5–5.1)
PROT UR STRIP-MCNC: 30 MG/DL
RBC # BLD AUTO: 3.95 M/UL (ref 4.2–5.4)
RBC #/AREA URNS HPF: ABNORMAL /HPF (ref 0–2)
SODIUM BLD-SCNC: 138 MEQ/L (ref 136–145)
SODIUM SERPL-SCNC: 135 MEQ/L (ref 135–144)
SP GR UR STRIP: 1.03 (ref 1–1.03)
TCO2 CALC VENOUS: 22 MMOL/L
URINE REFLEX TO CULTURE: ABNORMAL
UROBILINOGEN UR STRIP-ACNC: 1 E.U./DL
WBC # BLD AUTO: 9.6 K/UL (ref 4.8–10.8)
WBC #/AREA URNS HPF: ABNORMAL /HPF (ref 0–5)

## 2024-01-27 PROCEDURE — 84295 ASSAY OF SERUM SODIUM: CPT

## 2024-01-27 PROCEDURE — 6360000004 HC RX CONTRAST MEDICATION: Performed by: EMERGENCY MEDICINE

## 2024-01-27 PROCEDURE — 82565 ASSAY OF CREATININE: CPT

## 2024-01-27 PROCEDURE — 82435 ASSAY OF BLOOD CHLORIDE: CPT

## 2024-01-27 PROCEDURE — 82330 ASSAY OF CALCIUM: CPT

## 2024-01-27 PROCEDURE — 82803 BLOOD GASES ANY COMBINATION: CPT

## 2024-01-27 PROCEDURE — 85014 HEMATOCRIT: CPT

## 2024-01-27 PROCEDURE — 84132 ASSAY OF SERUM POTASSIUM: CPT

## 2024-01-27 RX ADMIN — IOPAMIDOL 75 ML: 755 INJECTION, SOLUTION INTRAVENOUS at 00:34

## 2024-01-27 NOTE — ED PROVIDER NOTES
[facility] ED  EMERGENCY DEPARTMENT ENCOUNTER      Pt Name: Wendi Gomez  MRN: [id number]  Birthdate 2000  Date of evaluation: 1/26/2024  Provider: ABDIRAHMAN CAMARA DO    CHIEF COMPLAINT       Chief Complaint   Patient presents with    Neck Injury     Tripped over a toy and hit right side of neck on corner of a table - states that she became dizzy and may have passed out         HISTORY OF PRESENT ILLNESS   (Location/Symptom, Timing/Onset, Context/Setting, Quality, Duration, Modifying Factors, Severity)  Note limiting factors.   Wendi Gomez is a 23 y.o. female who presents to the emergency department with neck pain after hitting the corner of the table.      The history is provided by the patient.   Neck Pain  Pain location:  R side  Quality:  Aching      Nursing Notes were reviewed.    REVIEW OF SYSTEMS    (2-9 systems for level 4, 10 or more for level 5)     Review of Systems   Musculoskeletal:  Positive for neck pain.       Except as noted above the remainder of the review of systems was reviewed and negative.       PAST MEDICAL HISTORY     Past Medical History:   Diagnosis Date    Asthma     Stomach ulcer     UTI (urinary tract infection)          SURGICAL HISTORY     History reviewed. No pertinent surgical history.      CURRENT MEDICATIONS       Previous Medications    ACETAMINOPHEN (TYLENOL) 500 MG TABLET    Take 1 tablet by mouth 4 times daily as needed for Pain    DEXTROMETHORPHAN-GUAIFENESIN (MUCINEX DM MAXIMUM STRENGTH)  MG TB12    Take 1 tablet by mouth in the morning and at bedtime    FLUOXETINE (PROZAC) 20 MG CAPSULE    Take 3 capsules by mouth daily    METOCLOPRAMIDE (REGLAN) 10 MG TABLET    Take 1 tablet by mouth 2 times daily as needed (Nausea/vomiting)    OLANZAPINE (ZYPREXA) 2.5 MG TABLET    Take 1 tablet by mouth nightly    ONDANSETRON (ZOFRAN) 4 MG TABLET    Take 1 tablet by mouth every 8 hours as needed for Nausea       ALLERGIES     Blueberry    FAMILY HISTORY     No

## 2024-01-31 ASSESSMENT — ENCOUNTER SYMPTOMS
BACK PAIN: 0
ABDOMINAL PAIN: 0
COUGH: 0
EYE REDNESS: 0
VOMITING: 0
NAUSEA: 0
EYE DISCHARGE: 0
SHORTNESS OF BREATH: 0
BOWEL INCONTINENCE: 0
SORE THROAT: 0

## 2024-02-09 ENCOUNTER — HOSPITAL ENCOUNTER (OUTPATIENT)
Dept: ULTRASOUND IMAGING | Age: 24
End: 2024-02-09
Payer: MEDICAID

## 2024-02-09 DIAGNOSIS — O09.892 HISTORY OF MATERNAL SYPHILIS, CURRENTLY PREGNANT, SECOND TRIMESTER: ICD-10-CM

## 2024-02-09 PROCEDURE — 76817 TRANSVAGINAL US OBSTETRIC: CPT

## 2024-02-09 PROCEDURE — 76805 OB US >/= 14 WKS SNGL FETUS: CPT

## 2024-02-22 ENCOUNTER — HOSPITAL ENCOUNTER (EMERGENCY)
Age: 24
Discharge: HOME | End: 2024-02-22
Payer: MEDICAID

## 2024-02-22 VITALS
RESPIRATION RATE: 20 BRPM | BODY MASS INDEX: 29.23 KG/M2 | HEIGHT: 63 IN | DIASTOLIC BLOOD PRESSURE: 70 MMHG | TEMPERATURE: 97.8 F | SYSTOLIC BLOOD PRESSURE: 92 MMHG | HEART RATE: 98 BPM | WEIGHT: 165 LBS | OXYGEN SATURATION: 100 %

## 2024-02-22 DIAGNOSIS — R82.71 BACTERIA IN URINE: ICD-10-CM

## 2024-02-22 DIAGNOSIS — M79.18 MUSCULOSKELETAL PAIN: Primary | ICD-10-CM

## 2024-02-22 LAB
AMORPH SED URNS QL MICRO: ABNORMAL
BACTERIA URNS QL MICRO: ABNORMAL /HPF
BILIRUB UR QL STRIP: ABNORMAL
CLARITY UR: ABNORMAL
COLOR UR: YELLOW
EPI CELLS #/AREA URNS HPF: ABNORMAL /HPF
GLUCOSE UR STRIP-MCNC: NEGATIVE MG/DL
HGB UR QL STRIP: NEGATIVE
KETONES UR STRIP-MCNC: NEGATIVE MG/DL
LEUKOCYTE ESTERASE UR QL STRIP: NEGATIVE
MUCOUS THREADS URNS QL MICRO: PRESENT /LPF
NITRITE UR QL STRIP: NEGATIVE
PH UR STRIP: 6 [PH] (ref 5–9)
PROT UR STRIP-MCNC: 30 MG/DL
RBC #/AREA URNS HPF: ABNORMAL /HPF (ref 0–2)
SP GR UR STRIP: >=1.03 (ref 1–1.03)
URINE REFLEX TO CULTURE: ABNORMAL
UROBILINOGEN UR STRIP-ACNC: 0.2 E.U./DL
WBC #/AREA URNS HPF: ABNORMAL /HPF (ref 0–5)

## 2024-02-22 PROCEDURE — 99283 EMERGENCY DEPT VISIT LOW MDM: CPT

## 2024-02-22 PROCEDURE — 6370000000 HC RX 637 (ALT 250 FOR IP): Performed by: NURSE PRACTITIONER

## 2024-02-22 PROCEDURE — 81001 URINALYSIS AUTO W/SCOPE: CPT

## 2024-02-22 RX ORDER — CEPHALEXIN 500 MG/1
500 CAPSULE ORAL ONCE
Status: COMPLETED | OUTPATIENT
Start: 2024-02-22 | End: 2024-02-22

## 2024-02-22 RX ORDER — CEPHALEXIN 500 MG/1
500 CAPSULE ORAL 3 TIMES DAILY
Qty: 21 CAPSULE | Refills: 0 | Status: SHIPPED | OUTPATIENT
Start: 2024-02-22 | End: 2024-02-29

## 2024-02-22 RX ADMIN — CEPHALEXIN 500 MG: 500 CAPSULE ORAL at 21:24

## 2024-02-22 ASSESSMENT — PAIN DESCRIPTION - LOCATION: LOCATION: ABDOMEN

## 2024-02-22 ASSESSMENT — PAIN - FUNCTIONAL ASSESSMENT
PAIN_FUNCTIONAL_ASSESSMENT: NONE - DENIES PAIN
PAIN_FUNCTIONAL_ASSESSMENT: ACTIVITIES ARE NOT PREVENTED
PAIN_FUNCTIONAL_ASSESSMENT: 0-10

## 2024-02-22 ASSESSMENT — PAIN DESCRIPTION - ONSET: ONSET: ON-GOING

## 2024-02-22 ASSESSMENT — PAIN DESCRIPTION - ORIENTATION: ORIENTATION: RIGHT;LOWER

## 2024-02-22 ASSESSMENT — LIFESTYLE VARIABLES
HOW OFTEN DO YOU HAVE A DRINK CONTAINING ALCOHOL: NEVER
HOW MANY STANDARD DRINKS CONTAINING ALCOHOL DO YOU HAVE ON A TYPICAL DAY: PATIENT DOES NOT DRINK

## 2024-02-22 ASSESSMENT — PAIN DESCRIPTION - DESCRIPTORS: DESCRIPTORS: SHARP

## 2024-02-22 ASSESSMENT — PAIN DESCRIPTION - PAIN TYPE: TYPE: ACUTE PAIN

## 2024-02-22 ASSESSMENT — PAIN DESCRIPTION - FREQUENCY: FREQUENCY: CONTINUOUS

## 2024-02-22 ASSESSMENT — PAIN SCALES - GENERAL: PAINLEVEL_OUTOF10: 7

## 2024-02-23 ASSESSMENT — ENCOUNTER SYMPTOMS
VOMITING: 0
SORE THROAT: 0
TROUBLE SWALLOWING: 0
BACK PAIN: 0
ABDOMINAL DISTENTION: 0
FACIAL SWELLING: 0
EYE ITCHING: 0
PHOTOPHOBIA: 0
EYE PAIN: 0
SINUS PAIN: 0
CONSTIPATION: 0
EYE DISCHARGE: 0
APNEA: 0
STRIDOR: 0
CHEST TIGHTNESS: 0
WHEEZING: 0
DIARRHEA: 0
CHOKING: 0
ALLERGIC/IMMUNOLOGIC NEGATIVE: 1
VOICE CHANGE: 0
COUGH: 0
BLOOD IN STOOL: 0
ABDOMINAL PAIN: 1
COLOR CHANGE: 0
NAUSEA: 0
EYE REDNESS: 0
RHINORRHEA: 0
SHORTNESS OF BREATH: 0
SINUS PRESSURE: 0

## 2024-02-23 NOTE — DISCHARGE INSTR - COC
Continuity of Care Form    Patient Name: Wendi Gomez   :  2000  MRN:  [id number]    Admit date:  2024  Discharge date:  ***    Code Status Order: No Order   Advance Directives:     Admitting Physician:  No admitting provider for patient encounter.  PCP: No primary care provider on file.    Discharging Nurse: ***  Discharging Hospital Unit/Room#: 10/10  Discharging Unit Phone Number: ***    Emergency Contact:   Extended Emergency Contact Information  Primary Emergency Contact: Slime [employer]  Home Phone: [phone]  Relation: Parent  Secondary Emergency Contact: Joby Lux  Address: [address] [place] [id number] [facility]  Home Phone: [phone]  Relation: Boyfriend    Past Surgical History:  No past surgical history on file.    Immunization History:     There is no immunization history on file for this patient.    Active Problems:  There is no problem list on file for this patient.      Isolation/Infection:   Isolation            No Isolation          Patient Infection Status       None to display                     Nurse Assessment:  Last Vital Signs: BP 92/70   Pulse 98   Temp 97.8 °F (36.6 °C) (Oral)   Resp 20   Ht 1.6 m (5' 3\")   Wt 74.8 kg (165 lb)   LMP 10/11/2023   SpO2 100%   BMI 29.23 kg/m²     Last documented pain score (0-10 scale): Pain Level: 7  Last Weight:   Wt Readings from Last 1 Encounters:   24 74.8 kg (165 lb)     Mental Status:  {IP PT MENTAL STATUS:62025}    IV Access:  { TAYLOR IV ACCESS:379092445}    Nursing Mobility/ADLs:  Walking   {CHP DME ADLs:655579229}  Transfer  {CHP DME ADLs:679581541}  Bathing  {CHP DME ADLs:974625619}  Dressing  {CHP DME ADLs:005590856}  Toileting  {CHP DME ADLs:968452272}  Feeding  {CHP DME ADLs:618111900}  Med Admin  {CHP DME ADLs:819920710}  Med Delivery   { TAYLOR MED Delivery:808862568}    Wound Care Documentation and Therapy:        Elimination:  Continence:   Bowel: {YES /

## 2024-02-23 NOTE — ED PROVIDER NOTES
[facility] ED  EMERGENCY DEPARTMENT ENCOUNTER      Pt Name: Wendi Gomez  MRN: [id number]  Birthdate 2000  Date of evaluation: 2/22/2024  Provider: MATHEUS Colorado CNP    CHIEF COMPLAINT       Chief Complaint   Patient presents with    Abdominal Pain     RLQ pain; 20 weeks pregnant; still has appendix; onset today; prior miscarriage     Emesis         HISTORY OF PRESENT ILLNESS   (Location/Symptom, Timing/Onset, Context/Setting, Quality, Duration, Modifying Factors, Severity)  Note limiting factors.   Wendi Gomez is a 23 y.o. female who, per chart review, has past medical history of asthma, ulcers, UTIs presents to the emergency department with c/o sudden onset, sharp, stabbing RLQ abd pain that radiates around to lower back and down her R leg.  Pt states she was sweeping the floor and doing heavy lifting at work when the pain started. States she is approximately 20 weeks pregnant and just wants to make sure everything is ok with the baby.  She denies vaginal bleeding, discharge, chest pain, shortness of breath, palpitations, fevers, nausea, vomiting, diarrhea, dysuria, hematuria, flank pain, incontinence.  She did not take any medications today for her symptoms.  She states pain is progressively getting better since it started.      Nursing Notes were reviewed.    REVIEW OF SYSTEMS    (2-9 systems for level 4, 10 or more for level 5)     Review of Systems   Constitutional:  Negative for chills, diaphoresis, fatigue and fever.   HENT:  Negative for congestion, dental problem, drooling, ear discharge, ear pain, facial swelling, hearing loss, mouth sores, nosebleeds, postnasal drip, rhinorrhea, sinus pressure, sinus pain, sneezing, sore throat, tinnitus, trouble swallowing and voice change.    Eyes:  Negative for photophobia, pain, discharge, redness, itching and visual disturbance.   Respiratory:  Negative for apnea, cough, choking, chest tightness, shortness of breath, wheezing and

## 2024-04-10 ENCOUNTER — HOSPITAL ENCOUNTER (EMERGENCY)
Age: 24
Discharge: HOME | End: 2024-04-10
Attending: EMERGENCY MEDICINE
Payer: MEDICAID

## 2024-04-10 VITALS
DIASTOLIC BLOOD PRESSURE: 72 MMHG | TEMPERATURE: 97.7 F | OXYGEN SATURATION: 100 % | WEIGHT: 175 LBS | SYSTOLIC BLOOD PRESSURE: 122 MMHG | HEART RATE: 61 BPM | HEIGHT: 63 IN | BODY MASS INDEX: 31.01 KG/M2 | RESPIRATION RATE: 16 BRPM

## 2024-04-10 DIAGNOSIS — O21.0 HYPEREMESIS GRAVIDARUM: Primary | ICD-10-CM

## 2024-04-10 LAB
ALBUMIN SERPL-MCNC: 3.8 G/DL (ref 3.5–4.6)
ALP SERPL-CCNC: 125 U/L (ref 40–130)
ALT SERPL-CCNC: 8 U/L (ref 0–33)
ANION GAP SERPL CALCULATED.3IONS-SCNC: 15 MEQ/L (ref 9–15)
AST SERPL-CCNC: 12 U/L (ref 0–35)
BACTERIA URNS QL MICRO: ABNORMAL /HPF
BASOPHILS # BLD: 0 K/UL (ref 0–0.1)
BASOPHILS NFR BLD: 0.2 % (ref 0.1–1.2)
BILIRUB SERPL-MCNC: <0.2 MG/DL (ref 0.2–0.7)
BILIRUB UR QL STRIP: ABNORMAL
BUN SERPL-MCNC: 7 MG/DL (ref 6–20)
CALCIUM SERPL-MCNC: 9.3 MG/DL (ref 8.5–9.9)
CHLORIDE SERPL-SCNC: 101 MEQ/L (ref 95–107)
CLARITY UR: CLEAR
CO2 SERPL-SCNC: 20 MEQ/L (ref 20–31)
COLOR UR: ABNORMAL
CREAT SERPL-MCNC: 0.54 MG/DL (ref 0.5–0.9)
EOSINOPHIL # BLD: 0.2 K/UL (ref 0–0.4)
EOSINOPHIL NFR BLD: 1.1 % (ref 0.7–5.8)
EPI CELLS #/AREA URNS HPF: ABNORMAL /HPF
ERYTHROCYTE [DISTWIDTH] IN BLOOD BY AUTOMATED COUNT: 13.5 % (ref 11.7–14.4)
GLOBULIN SER CALC-MCNC: 3.5 G/DL (ref 2.3–3.5)
GLUCOSE SERPL-MCNC: 94 MG/DL (ref 70–99)
GLUCOSE UR STRIP-MCNC: NEGATIVE MG/DL
HCT VFR BLD AUTO: 34.9 % (ref 37–47)
HGB BLD-MCNC: 11.9 G/DL (ref 11.2–15.7)
HGB UR QL STRIP: NEGATIVE
IMM GRANULOCYTES # BLD: 0.1 K/UL
IMM GRANULOCYTES NFR BLD: 0.5 %
INFLUENZA A BY PCR: NEGATIVE
INFLUENZA B BY PCR: NEGATIVE
KETONES UR STRIP-MCNC: 15 MG/DL
LEUKOCYTE ESTERASE UR QL STRIP: ABNORMAL
LIPASE SERPL-CCNC: 19 U/L (ref 12–95)
LYMPHOCYTES # BLD: 1.9 K/UL (ref 1.2–3.7)
LYMPHOCYTES NFR BLD: 13.9 %
MCH RBC QN AUTO: 30.1 PG (ref 25.6–32.2)
MCHC RBC AUTO-ENTMCNC: 34.1 % (ref 32.2–35.5)
MCV RBC AUTO: 88.1 FL (ref 79.4–94.8)
MONOCYTES # BLD: 0.5 K/UL (ref 0.2–0.9)
MONOCYTES NFR BLD: 3.7 % (ref 4.7–12.5)
NEUTROPHILS # BLD: 11.1 K/UL (ref 1.6–6.1)
NEUTS SEG NFR BLD: 80.6 % (ref 34–71.1)
NITRITE UR QL STRIP: NEGATIVE
PH UR STRIP: 7 [PH] (ref 5–9)
PLATELET # BLD AUTO: 277 K/UL (ref 182–369)
POTASSIUM SERPL-SCNC: 3.6 MEQ/L (ref 3.4–4.9)
PROT SERPL-MCNC: 7.3 G/DL (ref 6.3–8)
PROT UR STRIP-MCNC: 30 MG/DL
RBC # BLD AUTO: 3.96 M/UL (ref 3.93–5.22)
RBC #/AREA URNS HPF: ABNORMAL /HPF (ref 0–2)
SODIUM SERPL-SCNC: 136 MEQ/L (ref 135–144)
SP GR UR STRIP: 1.02 (ref 1–1.03)
UROBILINOGEN UR STRIP-ACNC: 1 E.U./DL
WBC # BLD AUTO: 13.8 K/UL (ref 4–10)
WBC #/AREA URNS HPF: ABNORMAL /HPF (ref 0–5)

## 2024-04-10 PROCEDURE — 83690 ASSAY OF LIPASE: CPT

## 2024-04-10 PROCEDURE — 96361 HYDRATE IV INFUSION ADD-ON: CPT

## 2024-04-10 PROCEDURE — 81001 URINALYSIS AUTO W/SCOPE: CPT

## 2024-04-10 PROCEDURE — 2580000003 HC RX 258: Performed by: EMERGENCY MEDICINE

## 2024-04-10 PROCEDURE — 80053 COMPREHEN METABOLIC PANEL: CPT

## 2024-04-10 PROCEDURE — 87502 INFLUENZA DNA AMP PROBE: CPT

## 2024-04-10 PROCEDURE — 99284 EMERGENCY DEPT VISIT MOD MDM: CPT

## 2024-04-10 PROCEDURE — 6360000002 HC RX W HCPCS: Performed by: EMERGENCY MEDICINE

## 2024-04-10 PROCEDURE — 96374 THER/PROPH/DIAG INJ IV PUSH: CPT

## 2024-04-10 PROCEDURE — 85025 COMPLETE CBC W/AUTO DIFF WBC: CPT

## 2024-04-10 PROCEDURE — 2580000003 HC RX 258

## 2024-04-10 PROCEDURE — 36415 COLL VENOUS BLD VENIPUNCTURE: CPT

## 2024-04-10 PROCEDURE — 99283 EMERGENCY DEPT VISIT LOW MDM: CPT

## 2024-04-10 RX ORDER — 0.9 % SODIUM CHLORIDE 0.9 %
2000 INTRAVENOUS SOLUTION INTRAVENOUS ONCE
Status: COMPLETED | OUTPATIENT
Start: 2024-04-10 | End: 2024-04-10

## 2024-04-10 RX ORDER — SODIUM CHLORIDE 9 MG/ML
INJECTION, SOLUTION INTRAVENOUS
Status: COMPLETED
Start: 2024-04-10 | End: 2024-04-10

## 2024-04-10 RX ORDER — METOCLOPRAMIDE HYDROCHLORIDE 5 MG/ML
10 INJECTION INTRAMUSCULAR; INTRAVENOUS ONCE
Status: COMPLETED | OUTPATIENT
Start: 2024-04-10 | End: 2024-04-10

## 2024-04-10 RX ADMIN — SODIUM CHLORIDE 2000 ML: 9 INJECTION, SOLUTION INTRAVENOUS at 10:47

## 2024-04-10 RX ADMIN — METOCLOPRAMIDE HYDROCHLORIDE 10 MG: 5 INJECTION INTRAMUSCULAR; INTRAVENOUS at 10:47

## 2024-04-10 RX ADMIN — SODIUM CHLORIDE 50 ML: 9 INJECTION, SOLUTION INTRAVENOUS at 10:47

## 2024-04-10 ASSESSMENT — PAIN DESCRIPTION - PAIN TYPE: TYPE: ACUTE PAIN

## 2024-04-10 ASSESSMENT — PAIN DESCRIPTION - DESCRIPTORS: DESCRIPTORS: ACHING

## 2024-04-10 ASSESSMENT — PAIN - FUNCTIONAL ASSESSMENT
PAIN_FUNCTIONAL_ASSESSMENT: PREVENTS OR INTERFERES SOME ACTIVE ACTIVITIES AND ADLS
PAIN_FUNCTIONAL_ASSESSMENT: NONE - DENIES PAIN
PAIN_FUNCTIONAL_ASSESSMENT: NONE - DENIES PAIN
PAIN_FUNCTIONAL_ASSESSMENT: 0-10

## 2024-04-10 ASSESSMENT — PAIN SCALES - GENERAL
PAINLEVEL_OUTOF10: 5
PAINLEVEL_OUTOF10: 0
PAINLEVEL_OUTOF10: 0

## 2024-04-10 ASSESSMENT — PAIN DESCRIPTION - LOCATION: LOCATION: ABDOMEN;RIB CAGE

## 2024-04-10 ASSESSMENT — PAIN DESCRIPTION - ORIENTATION: ORIENTATION: RIGHT

## 2024-04-10 NOTE — ED NOTES
Discharge instructions reviewed with patient.  No questions at this time.  Follow up discussed. Patient has OB appointment this Friday.  Patient ambulatory in stable condition with significant other at discharge.

## 2024-04-10 NOTE — ED TRIAGE NOTES
Patient states she can't keep anything down x 3 days. Feeling lightheaded. She is 27 weeks pregnant,

## 2024-04-10 NOTE — ED PROVIDER NOTES
[facility] ED  EMERGENCY DEPARTMENT ENCOUNTER      Pt Name: Wendi Gomez  MRN: [id number]  Birthdate 2000  Date of evaluation: 4/10/2024  Provider: Sami Osorio MD  Time Note started  10:27 AM EDT  4/10/24           NURSING NOTES REVIEWED     Pt evaluated in a timely manner      CURRENT MEDICATIONS       Discharge Medication List as of 4/10/2024 12:50 PM        CONTINUE these medications which have NOT CHANGED    Details   ondansetron (ZOFRAN) 4 MG tablet Take 1 tablet by mouth every 8 hours as needed for Nausea, Disp-20 tablet, R-0Normal      acetaminophen (TYLENOL) 500 MG tablet Take 1 tablet by mouth 4 times daily as needed for Pain, Disp-50 tablet, R-0Normal      OLANZapine (ZYPREXA) 2.5 MG tablet Take 1 tablet by mouth nightlyHistorical Med      FLUoxetine (PROZAC) 20 MG capsule Take 3 capsules by mouth dailyHistorical Med      Dextromethorphan-guaiFENesin (MUCINEX DM MAXIMUM STRENGTH)  MG TB12 Take 1 tablet by mouth in the morning and at bedtime, Disp-20 tablet, R-0Print      metoclopramide (REGLAN) 10 MG tablet Take 1 tablet by mouth 2 times daily as needed (Nausea/vomiting), Disp-60 tablet, R-0Print             ALLERGIES     Blueberry    FAMILY HISTORY     History reviewed. No pertinent family history.       SOCIAL HISTORY       Social History     Socioeconomic History    Marital status: Single     Spouse name: None    Number of children: None    Years of education: None    Highest education level: None   Tobacco Use    Smoking status: Every Day     Current packs/day: 0.50     Types: Cigarettes    Smokeless tobacco: Never   Substance and Sexual Activity    Alcohol use: Not Currently    Drug use: Yes     Types: Marijuana (Weed)    Sexual activity: Yes     Partners: Male         Social determinants of health impacting treatment or disposition: Patient does not have a family physician of record-provided with healthcare resources (patient does not need however have an obstetrician

## 2024-04-24 ENCOUNTER — HOSPITAL ENCOUNTER (INPATIENT)
Age: 24
LOS: 4 days | Discharge: HOME | DRG: 566 | End: 2024-04-29
Attending: STUDENT IN AN ORGANIZED HEALTH CARE EDUCATION/TRAINING PROGRAM | Admitting: PSYCHIATRY & NEUROLOGY
Payer: MEDICAID

## 2024-04-24 DIAGNOSIS — F32.A DEPRESSION, UNSPECIFIED DEPRESSION TYPE: Primary | ICD-10-CM

## 2024-04-24 LAB
ALBUMIN SERPL-MCNC: 3.9 G/DL (ref 3.5–4.6)
ALP SERPL-CCNC: 111 U/L (ref 40–130)
ALT SERPL-CCNC: 6 U/L (ref 0–33)
AMPHET UR QL SCN: ABNORMAL
ANION GAP SERPL CALCULATED.3IONS-SCNC: 13 MEQ/L (ref 9–15)
APAP SERPL-MCNC: <5 UG/ML (ref 10–30)
AST SERPL-CCNC: 9 U/L (ref 0–35)
BARBITURATES UR QL SCN: ABNORMAL
BASOPHILS # BLD: 0 K/UL (ref 0–0.2)
BASOPHILS NFR BLD: 0.3 %
BENZODIAZ UR QL SCN: ABNORMAL
BILIRUB SERPL-MCNC: <0.2 MG/DL (ref 0.2–0.7)
BILIRUB UR QL STRIP: NEGATIVE
BUN SERPL-MCNC: 7 MG/DL (ref 6–20)
CALCIUM SERPL-MCNC: 9.3 MG/DL (ref 8.5–9.9)
CANNABINOIDS UR QL SCN: POSITIVE
CHLORIDE SERPL-SCNC: 102 MEQ/L (ref 95–107)
CHOLEST SERPL-MCNC: 306 MG/DL (ref 0–199)
CK SERPL-CCNC: 21 U/L (ref 0–170)
CLARITY UR: ABNORMAL
CO2 SERPL-SCNC: 19 MEQ/L (ref 20–31)
COCAINE UR QL SCN: ABNORMAL
COLOR UR: YELLOW
CREAT SERPL-MCNC: 0.52 MG/DL (ref 0.5–0.9)
DRUG SCREEN COMMENT UR-IMP: ABNORMAL
EOSINOPHIL # BLD: 0.1 K/UL (ref 0–0.7)
EOSINOPHIL NFR BLD: 0.8 %
ERYTHROCYTE [DISTWIDTH] IN BLOOD BY AUTOMATED COUNT: 13 % (ref 11.5–14.5)
ETHANOL PERCENT: NORMAL G/DL
ETHANOLAMINE SERPL-MCNC: <10 MG/DL (ref 0–0.08)
FENTANYL SCREEN, URINE: ABNORMAL
GLOBULIN SER CALC-MCNC: 3.3 G/DL (ref 2.3–3.5)
GLUCOSE SERPL-MCNC: 117 MG/DL (ref 70–99)
GLUCOSE UR STRIP-MCNC: NEGATIVE MG/DL
HCG UR QL: POSITIVE
HCT VFR BLD AUTO: 34.3 % (ref 37–47)
HDLC SERPL-MCNC: 63 MG/DL (ref 40–59)
HGB BLD-MCNC: 11.8 G/DL (ref 12–16)
HGB UR QL STRIP: NEGATIVE
KETONES UR STRIP-MCNC: ABNORMAL MG/DL
LDLC SERPL CALC-MCNC: ABNORMAL MG/DL (ref 0–129)
LEUKOCYTE ESTERASE UR QL STRIP: NEGATIVE
LYMPHOCYTES # BLD: 1.8 K/UL (ref 1–4.8)
LYMPHOCYTES NFR BLD: 14.7 %
MCH RBC QN AUTO: 29.9 PG (ref 27–31.3)
MCHC RBC AUTO-ENTMCNC: 34.4 % (ref 33–37)
MCV RBC AUTO: 86.8 FL (ref 79.4–94.8)
METHADONE UR QL SCN: ABNORMAL
MONOCYTES # BLD: 0.6 K/UL (ref 0.2–0.8)
MONOCYTES NFR BLD: 4.6 %
NEUTROPHILS # BLD: 9.5 K/UL (ref 1.4–6.5)
NEUTS SEG NFR BLD: 79.3 %
NITRITE UR QL STRIP: NEGATIVE
OPIATES UR QL SCN: ABNORMAL
OXYCODONE UR QL SCN: ABNORMAL
PCP UR QL SCN: ABNORMAL
PH UR STRIP: 7.5 [PH] (ref 5–9)
PLATELET # BLD AUTO: 278 K/UL (ref 130–400)
POTASSIUM SERPL-SCNC: 4 MEQ/L (ref 3.4–4.9)
PROPOXYPH UR QL SCN: ABNORMAL
PROT SERPL-MCNC: 7.2 G/DL (ref 6.3–8)
PROT UR STRIP-MCNC: ABNORMAL MG/DL
RBC # BLD AUTO: 3.95 M/UL (ref 4.2–5.4)
SALICYLATES SERPL-MCNC: <0.3 MG/DL (ref 15–30)
SODIUM SERPL-SCNC: 134 MEQ/L (ref 135–144)
SP GR UR STRIP: 1.02 (ref 1–1.03)
TRIGL SERPL-MCNC: 413 MG/DL (ref 0–150)
TSH SERPL-MCNC: 1.64 UIU/ML (ref 0.44–3.86)
URINE REFLEX TO CULTURE: ABNORMAL
UROBILINOGEN UR STRIP-ACNC: 1 E.U./DL
WBC # BLD AUTO: 12 K/UL (ref 4.8–10.8)

## 2024-04-24 PROCEDURE — 82550 ASSAY OF CK (CPK): CPT

## 2024-04-24 PROCEDURE — 83036 HEMOGLOBIN GLYCOSYLATED A1C: CPT

## 2024-04-24 PROCEDURE — 80143 DRUG ASSAY ACETAMINOPHEN: CPT

## 2024-04-24 PROCEDURE — 99285 EMERGENCY DEPT VISIT HI MDM: CPT

## 2024-04-24 PROCEDURE — 93005 ELECTROCARDIOGRAM TRACING: CPT | Performed by: PHYSICIAN ASSISTANT

## 2024-04-24 PROCEDURE — 84703 CHORIONIC GONADOTROPIN ASSAY: CPT

## 2024-04-24 PROCEDURE — 81003 URINALYSIS AUTO W/O SCOPE: CPT

## 2024-04-24 PROCEDURE — 80179 DRUG ASSAY SALICYLATE: CPT

## 2024-04-24 PROCEDURE — 80053 COMPREHEN METABOLIC PANEL: CPT

## 2024-04-24 PROCEDURE — 85025 COMPLETE CBC W/AUTO DIFF WBC: CPT

## 2024-04-24 PROCEDURE — 84443 ASSAY THYROID STIM HORMONE: CPT

## 2024-04-24 PROCEDURE — 82077 ASSAY SPEC XCP UR&BREATH IA: CPT

## 2024-04-24 PROCEDURE — 80307 DRUG TEST PRSMV CHEM ANLYZR: CPT

## 2024-04-24 PROCEDURE — 36415 COLL VENOUS BLD VENIPUNCTURE: CPT

## 2024-04-24 PROCEDURE — 80061 LIPID PANEL: CPT

## 2024-04-24 ASSESSMENT — PAIN SCALES - GENERAL: PAINLEVEL_OUTOF10: 0

## 2024-04-24 ASSESSMENT — PAIN - FUNCTIONAL ASSESSMENT: PAIN_FUNCTIONAL_ASSESSMENT: 0-10

## 2024-04-24 NOTE — ED NOTES
Sent pt's urine to the lab, lab was here and antonio the pt's blood work, she was cooperative with the lab staff.  Pt is tearful at times but accepts direction.

## 2024-04-24 NOTE — ED NOTES
Pt's family left the -ER and have the pt's HIPAA code she gave to her boyfriend and her mother before they left visiting with her

## 2024-04-24 NOTE — ED NOTES
Alvin Alcocer saw the pt and wanted a nurse to do fetal hear rated, called charge nurse who asked me to call zone 2 for a nurse to complete the heart tones with the pt.  Called zone 2 talked with Alvin advised him that nurse in main ER said to have a nurse in zone 2 complete the fetal heart tones, Alvin Alcocer will find a nurse to complete the fetal heart tones

## 2024-04-24 NOTE — ED NOTES
Provisional Diagnosis:   Depression Unspecified  R/O Psychosis Unspecified    Psychosocial and Contextual Factors:   Pt lives with her mother, step-mother, two brothers, and her fiance  Pt attended 11th grade ar [employer] in [place].  Pt came to [place] from [place] on 1/2023.  No other school, no  services.  Pt dropped out of high school due to bullying at the school.  Pt has never been , this is her first pregnancy per pt she is 28 weeks pregnant.  Pt had an ectopic pregnancy when she was 19 years old and lost the child, no other miscarriages.  Pt was working at [employer] had been there since 12/2023 and was let go three weeks ago. Pt lost her job due to her pregnancy and not able to work    [employer] Summary:    Within the past month have you wished you were dead or wished you could go to sleep and not wake up?   \"Yes, I wished I was dead.\"  Have you actually had thoughts of wanting to kill yourself?  2.   \"Yes, I have had thoughts recently the voices were telling me to kill myself, they were just loud before hard to understand\"  \"The voices are telling me to cut my wrist or take pills.\"  Have you been thinking about how you might kill yourself?  3.  \"Yes, by cutting my wrists or taking pills.\"  Have you had these thoughts and some intention of acting on them?  4.   \"I really don't want to but I can't stand the voices and went to [employer] today.\"  Have you started to work out the details of how to kill yourself?  5.  The voices wanted me to cut my wrists or take pills but I didn't want to I didn't want to hurt my baby.\"  Have you ever done anything, started to do anything, or prepared to do anything to kill yourself?  6.  \"I have had multiple attempts my last attempt was when I was 19 years old.\"      Patient: S/I hearing voices  Family: Supportive family  Agency: Past [facility] closed in 2023  [facility] for a psych appointment but did not go to this appointment  Pt is having her

## 2024-04-24 NOTE — ED NOTES
Registration is here with the pt, and Alvin Alcocer PA-C is seeing the pt at the bedside for medical assessment

## 2024-04-24 NOTE — ED NOTES
Called lab as added a serum pregnancy test wanted to make sure they had the blood for this test as a urine was also completed.

## 2024-04-24 NOTE — ED NOTES
Pt was cooperative with completing her EKG at the bedside with -ER staff.  Pt is quiet and cooperative with even respirations

## 2024-04-24 NOTE — ED NOTES
Linda assessed the pt and pink slipped the pt to the ER.  Pt is hearing voices telling her to harm herself, pt is 28 weeks pregnant, she sees a doctor Gaston Sultana at [facility]  She is afraid she will harm her child.  Pt has an eating D/O and she is nauseated and not eating.

## 2024-04-24 NOTE — ED TRIAGE NOTES
Pt was assessed at [facility] and sent to the ER for an evaluation  Pt has been hearing command hallucinations, she usually hears voices but they are loud and unable to understand the voices, the command has been more intense past several weeks  No V Hallucinations  Pt has had urges to cut feels like her skin is crawling and bothering her, per pt she has not cut for 3-4 years but recently has felt like cutting but did not.  Per pt she has S/I with a plan to cut her wrists or take pills to overdose on.  Pt did not hurt herself before her arrival to the ER

## 2024-04-24 NOTE — ED NOTES
Pt is visiting with her mother, Slime and her boyfriend, Joby at the bedside, she is tearful but cooperative.  She was reviewed what HIPAA was and she gave the code for herself to her mother and her boyfriend, left numbers for her mother, Slime [phone] and Joby [phone]

## 2024-04-24 NOTE — ED NOTES
Pt changed in the bathroom into gown and pants, no open skin areas, no rashes, no contraband found, urine obtained and sent to the lab.  Pt was agreeable for her mother and boyfriend to visit with her currently

## 2024-04-24 NOTE — ED PROVIDER NOTES
[facility] ED  EMERGENCY DEPARTMENT ENCOUNTER      Pt Name: Wendi Gomez  MRN: [id number]  Birthdate 2000  Date of evaluation: 4/24/2024  Provider: Alvin Alcocer PA-C  4:44 PM EDT      CHIEF COMPLAINT       Chief Complaint   Patient presents with    Psychiatric Evaluation         HISTORY OF PRESENT ILLNESS   (Location/Symptom, Timing/Onset, Context/Setting, Quality, Duration, Modifying Factors, Severity)  Note limiting factors.   Wendi Gomez is a 23 y.o. female who presents to the emergency department with PMHx 28 weeks pregnant who presents to the emergency department with complaints of auditory hallucinations and suicidal ideation, though she states that it is not actually the thought of wanting to kill her self but rather she struggles with self-harm and she wants to hurt herself.  Patient denies any homicidal ideation.  Patient is supposed to be on medicines but stopped taking them 3 weeks ago and did not advise her OB doctor of this.  Patient denies any complications with the pregnancy    HPI    Nursing Notes were reviewed.    REVIEW OF SYSTEMS    (2-9 systems for level 4, 10 or more for level 5)     Review of Systems   Constitutional:  Negative for fever.   Gastrointestinal:  Negative for abdominal pain.   Genitourinary:  Negative for pelvic pain, vaginal bleeding and vaginal pain.   Psychiatric/Behavioral:  Positive for hallucinations. The patient is nervous/anxious.    All other systems reviewed and are negative.      Except as noted above the remainder of the review of systems was reviewed and negative.       PAST MEDICAL HISTORY     Past Medical History:   Diagnosis Date    Asthma     Stomach ulcer     UTI (urinary tract infection)          SURGICAL HISTORY     History reviewed. No pertinent surgical history.      CURRENT MEDICATIONS       Previous Medications    ACETAMINOPHEN (TYLENOL) 500 MG TABLET    Take 1 tablet by mouth 4 times daily as needed for Pain

## 2024-04-25 PROBLEM — F32.A DEPRESSION, UNSPECIFIED: Status: ACTIVE | Noted: 2024-04-25

## 2024-04-25 LAB
EKG ATRIAL RATE: 75 BPM
EKG P AXIS: 33 DEGREES
EKG P-R INTERVAL: 148 MS
EKG Q-T INTERVAL: 392 MS
EKG QRS DURATION: 88 MS
EKG QTC CALCULATION (BAZETT): 437 MS
EKG R AXIS: 39 DEGREES
EKG T AXIS: 16 DEGREES
EKG VENTRICULAR RATE: 75 BPM
ESTIMATED AVERAGE GLUCOSE: 91 MG/DL
HBA1C MFR BLD: 4.8 % (ref 4–6)
SARS-COV-2 RDRP RESP QL NAA+PROBE: NOT DETECTED

## 2024-04-25 PROCEDURE — 1240000000 HC EMOTIONAL WELLNESS R&B

## 2024-04-25 PROCEDURE — 6370000000 HC RX 637 (ALT 250 FOR IP): Performed by: PSYCHIATRY & NEUROLOGY

## 2024-04-25 PROCEDURE — 87635 SARS-COV-2 COVID-19 AMP PRB: CPT

## 2024-04-25 PROCEDURE — 6370000000 HC RX 637 (ALT 250 FOR IP): Performed by: PHYSICIAN ASSISTANT

## 2024-04-25 PROCEDURE — 99223 1ST HOSP IP/OBS HIGH 75: CPT | Performed by: PSYCHIATRY & NEUROLOGY

## 2024-04-25 RX ORDER — MAGNESIUM HYDROXIDE/ALUMINUM HYDROXICE/SIMETHICONE 120; 1200; 1200 MG/30ML; MG/30ML; MG/30ML
30 SUSPENSION ORAL PRN
Status: DISCONTINUED | OUTPATIENT
Start: 2024-04-25 | End: 2024-04-29 | Stop reason: HOSPADM

## 2024-04-25 RX ORDER — ACETAMINOPHEN 325 MG/1
650 TABLET ORAL EVERY 4 HOURS PRN
Status: DISCONTINUED | OUTPATIENT
Start: 2024-04-25 | End: 2024-04-29 | Stop reason: HOSPADM

## 2024-04-25 RX ORDER — ONDANSETRON 4 MG/1
4 TABLET, ORALLY DISINTEGRATING ORAL ONCE
Status: COMPLETED | OUTPATIENT
Start: 2024-04-25 | End: 2024-04-25

## 2024-04-25 RX ORDER — LANOLIN ALCOHOL/MO/W.PET/CERES
3 CREAM (GRAM) TOPICAL NIGHTLY
Status: DISCONTINUED | OUTPATIENT
Start: 2024-04-25 | End: 2024-04-26

## 2024-04-25 RX ORDER — HALOPERIDOL 5 MG/1
5 TABLET ORAL EVERY 6 HOURS PRN
Status: DISCONTINUED | OUTPATIENT
Start: 2024-04-25 | End: 2024-04-29 | Stop reason: HOSPADM

## 2024-04-25 RX ORDER — POLYETHYLENE GLYCOL 3350 17 G
2 POWDER IN PACKET (EA) ORAL
Status: DISCONTINUED | OUTPATIENT
Start: 2024-04-25 | End: 2024-04-25

## 2024-04-25 RX ORDER — HALOPERIDOL 5 MG/ML
5 INJECTION INTRAMUSCULAR EVERY 6 HOURS PRN
Status: DISCONTINUED | OUTPATIENT
Start: 2024-04-25 | End: 2024-04-29 | Stop reason: HOSPADM

## 2024-04-25 RX ORDER — HYDROXYZINE HYDROCHLORIDE 50 MG/ML
50 INJECTION, SOLUTION INTRAMUSCULAR EVERY 6 HOURS PRN
Status: DISCONTINUED | OUTPATIENT
Start: 2024-04-25 | End: 2024-04-29 | Stop reason: HOSPADM

## 2024-04-25 RX ORDER — BENZTROPINE MESYLATE 1 MG/ML
2 INJECTION INTRAMUSCULAR; INTRAVENOUS 2 TIMES DAILY PRN
Status: DISCONTINUED | OUTPATIENT
Start: 2024-04-25 | End: 2024-04-29 | Stop reason: HOSPADM

## 2024-04-25 RX ORDER — QUETIAPINE FUMARATE 50 MG/1
50 TABLET, EXTENDED RELEASE ORAL NIGHTLY
Status: DISCONTINUED | OUTPATIENT
Start: 2024-04-25 | End: 2024-04-29 | Stop reason: HOSPADM

## 2024-04-25 RX ORDER — NICOTINE 21 MG/24HR
1 PATCH, TRANSDERMAL 24 HOURS TRANSDERMAL DAILY
Status: DISCONTINUED | OUTPATIENT
Start: 2024-04-25 | End: 2024-04-29 | Stop reason: HOSPADM

## 2024-04-25 RX ORDER — HYDROXYZINE PAMOATE 50 MG/1
50 CAPSULE ORAL EVERY 6 HOURS PRN
Status: DISCONTINUED | OUTPATIENT
Start: 2024-04-25 | End: 2024-04-29 | Stop reason: HOSPADM

## 2024-04-25 RX ORDER — LITHIUM CARBONATE 150 MG/1
150 CAPSULE ORAL 2 TIMES DAILY WITH MEALS
Status: DISCONTINUED | OUTPATIENT
Start: 2024-04-25 | End: 2024-04-29 | Stop reason: HOSPADM

## 2024-04-25 RX ADMIN — NICOTINE POLACRILEX 2 MG: 2 LOZENGE ORAL at 12:29

## 2024-04-25 RX ADMIN — Medication 3 MG: at 21:25

## 2024-04-25 RX ADMIN — LITHIUM CARBONATE 150 MG: 150 CAPSULE, GELATIN COATED ORAL at 16:10

## 2024-04-25 RX ADMIN — QUETIAPINE FUMARATE 50 MG: 50 TABLET, EXTENDED RELEASE ORAL at 21:25

## 2024-04-25 RX ADMIN — ONDANSETRON 4 MG: 4 TABLET, ORALLY DISINTEGRATING ORAL at 08:45

## 2024-04-25 ASSESSMENT — SLEEP AND FATIGUE QUESTIONNAIRES
DO YOU USE A SLEEP AID: YES
DO YOU HAVE DIFFICULTY SLEEPING: NO
SLEEP PATTERN: DIFFICULTY ARISING;NIGHTMARES/TERRORS
AVERAGE NUMBER OF SLEEP HOURS: 12

## 2024-04-25 ASSESSMENT — PAIN - FUNCTIONAL ASSESSMENT: PAIN_FUNCTIONAL_ASSESSMENT: NONE - DENIES PAIN

## 2024-04-25 NOTE — ED NOTES
Writer spoke with Dr. Pacheco OBGYN. Patient has good fetal heart tones. She has no OB complaints.  She is ok to go inpatient psych.

## 2024-04-25 NOTE — GROUP NOTE
Group Therapy Note    Date: 4/25/2024    Group Start Time: 0200  Group End Time: 0300  Group Topic:  Group    ML 3W Tami Davila LSW        Group Therapy Note    Attendees: 8/15         Notes:  Patient learned about passive, aggressive, and assertive communication and practical application and input regarding life circumstances. Patient able to identify their style of communication and gain and understanding of striving for the healthy style of assertive communication and having a voice in getting needs and wants met.    Status After Intervention:  Improved    Participation Level: Interactive    Participation Quality: Sharing      Speech:  normal      Thought Process/Content: Logical      Affective Functioning: Congruent      Mood: depressed      Level of consciousness:  Alert      Response to Learning: Capable of insight      Endings: None Reported    Modes of Intervention: Education      Discipline Responsible: /Counselor      Signature:  BRANT Vigil

## 2024-04-25 NOTE — H&P
severe  Borderline PD  ANUSHA      RISK ASSESSMENT:    SUICIDE RISK ASSESSMENT: high   HOMICIDE: low  AGITATION/VIOLENCE: low  ELOPEMENT: low    LABS: REVIEWED TODAY:  Recent Labs     04/24/24  1609   WBC 12.0*   HGB 11.8*        Recent Labs     04/24/24  1609   *   K 4.0      CO2 19*   BUN 7   CREATININE 0.52   GLUCOSE 117*     Recent Labs     04/24/24  1609   BILITOT <0.2   ALKPHOS 111   AST 9   ALT 6     Lab Results   Component Value Date/Time    LABAMPH Neg 04/24/2024 04:00 PM    BARBSCNU Neg 04/24/2024 04:00 PM    LABBENZ Neg 04/24/2024 04:00 PM    LABMETH Neg 04/24/2024 04:00 PM    OPIATESCREENURINE Neg 04/24/2024 04:00 PM    PHENCYCLIDINESCREENURINE Neg 04/24/2024 04:00 PM    ETOH <10 04/24/2024 04:09 PM     Lab Results   Component Value Date/Time    TSH 1.640 04/24/2024 04:09 PM     No results found for: \"LITHIUM\"  No results found for: \"VALPROATE\", \"CBMZ\"  No results found for: \"LITHIUM\", \"VALPROATE\"    FURTHER LABS ORDERED :      Radiology   No results found.    EKG: TRACING REVIEWED    TREATMENT PLAN:    Risk Management:  close watch and suicide risk    This patient was assessed for Medical bed necessity for the following reason:  N/A    Collateral Information:  Will obtain collateral information from the family or friends.  Will obtain medical records as appropriate from out patient providers  Will consult the hospitalist for a physical exam to rule out any co-morbid physical condition.    Home medication Reconciled       New Medications started during this admission :    See orders  Prn Haldol 5mg and Vistaril 50mg q6hr for extreme agitation.  Trazodone as ordered for insomnia  Vistaril as ordered for anxiety  Discussed with the patient risk, benefit, alternative and common side effects for the  proposed medication treatment. Patient is consenting to the treatment.    Psychotherapy:   Encourage participation in milieu and group therapy  Individual therapy as needed      Electronically

## 2024-04-25 NOTE — FLOWSHEET NOTE
Patient arrived to unit via wheelchair and walked to room with steady gait.  Skin check and contraband negative. Patient 28 weeks pregnant and very tearful.  Patient oriented to room and the floor, toiletries and ice water given.

## 2024-04-25 NOTE — ED NOTES
Per Dr. Sultana, pt placed with [facility] to refer to high risk pregnancy psych unit.

## 2024-04-25 NOTE — ED NOTES
Patient resting quietly with eyes closed. Respirations are even and unlabored. No distress noted at this time.

## 2024-04-25 NOTE — CARE COORDINATION
Leisure Assessment  April 25, 2024 /  1340 pm    Appearance: Alert, Appears as stated age, Hypervigilant, In moderate distress, and Pleasant  Current Mental Status: Cooperative, Insight into issues, and Severe anxiety  Affect/Mood: Congruent/ Anxious and Depressed  Thought Content/Processes: Linear  Insight/Judgement: Fair insight and Fair judgment  Speech: spontaneous, normal rate, and normal volume  Delusions/Hallucinations:  none observed/reported     Admit Status: Voluntary    Patient sitting in day area completing a word search upon approach. Patient was tearful, but agreeable to interview. Patient identified her leisure interests as singing, self-expression through art (painting, coloring, sculpting), and completing puzzles. Patient reported that she tends to isolate herself d/t paranoia that past traumas will reoccur if she is social with others. Patient identified her mother and her fiance as being very supportive of her and expressed feeling grateful for their help throughout the interview. Patient expressed that she sometimes has difficulty reaching out for help because \"I don't want to feel like a burden.\" Patient described the most stressful event(s) prior to hospitalization as family members recently passing away, her service animal recently passing away, and experiencing increasing depression/thoughts of harming herself. Patient confided her trauma history as well, explaining that the irrational beliefs stemming from past traumas are impacting her everyday life. Patient shared that she has been in therapy before, but \"I wasn't being honest because I wasn't ready to do that work, but now I am... for my son.\" Patient would like to work on improving her self-esteem and getting more comfortable around others. Patient identified her strength as being transparent.     Patient was future-focused, insightful, and forthcoming about wanting to engage in treatment. Patient was offered 1:1 music therapy services

## 2024-04-25 NOTE — GROUP NOTE
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     Modes of Intervention: Activity      Discipline Responsible: Behavorial Health Tech      Signature:  Gabby Louis

## 2024-04-26 PROBLEM — Z3A.30 30 WEEKS GESTATION OF PREGNANCY: Status: ACTIVE | Noted: 2024-04-26

## 2024-04-26 PROBLEM — F12.90 MARIJUANA USE DURING PREGNANCY: Status: ACTIVE | Noted: 2024-04-26

## 2024-04-26 PROBLEM — F32.A DEPRESSION: Status: ACTIVE | Noted: 2024-04-26

## 2024-04-26 PROBLEM — O99.333 TOBACCO USE IN PREGNANCY, ANTEPARTUM, THIRD TRIMESTER: Status: ACTIVE | Noted: 2024-04-26

## 2024-04-26 PROBLEM — F31.64 BIPOLAR AFFECTIVE DISORDER, MIXED, SEVERE, WITH PSYCHOTIC BEHAVIOR (HCC): Status: ACTIVE | Noted: 2024-04-25

## 2024-04-26 PROBLEM — O99.519 ASTHMA DURING PREGNANCY: Status: ACTIVE | Noted: 2024-04-26

## 2024-04-26 PROBLEM — J45.909 ASTHMA DURING PREGNANCY: Status: ACTIVE | Noted: 2024-04-26

## 2024-04-26 PROBLEM — O99.320 MARIJUANA USE DURING PREGNANCY: Status: ACTIVE | Noted: 2024-04-26

## 2024-04-26 PROBLEM — R03.0 ELEVATED BP WITHOUT DIAGNOSIS OF HYPERTENSION: Status: ACTIVE | Noted: 2024-04-26

## 2024-04-26 PROBLEM — O16.3 ELEVATED BLOOD PRESSURE COMPLICATING PREGNANCY, ANTEPARTUM, THIRD TRIMESTER: Status: ACTIVE | Noted: 2024-04-26

## 2024-04-26 PROBLEM — Z87.59 HX OF ECTOPIC PREGNANCY: Status: ACTIVE | Noted: 2024-04-26

## 2024-04-26 LAB — ABO + RH BLD: NORMAL

## 2024-04-26 PROCEDURE — 99255 IP/OBS CONSLTJ NEW/EST HI 80: CPT | Performed by: OBSTETRICS & GYNECOLOGY

## 2024-04-26 PROCEDURE — 36415 COLL VENOUS BLD VENIPUNCTURE: CPT

## 2024-04-26 PROCEDURE — 1240000000 HC EMOTIONAL WELLNESS R&B

## 2024-04-26 PROCEDURE — 6370000000 HC RX 637 (ALT 250 FOR IP): Performed by: PSYCHIATRY & NEUROLOGY

## 2024-04-26 PROCEDURE — 86901 BLOOD TYPING SEROLOGIC RH(D): CPT

## 2024-04-26 PROCEDURE — 99233 SBSQ HOSP IP/OBS HIGH 50: CPT | Performed by: PSYCHIATRY & NEUROLOGY

## 2024-04-26 PROCEDURE — 6370000000 HC RX 637 (ALT 250 FOR IP): Performed by: REGISTERED NURSE

## 2024-04-26 PROCEDURE — 86900 BLOOD TYPING SEROLOGIC ABO: CPT

## 2024-04-26 RX ORDER — ONDANSETRON 4 MG/1
4 TABLET, ORALLY DISINTEGRATING ORAL EVERY 8 HOURS PRN
Status: DISCONTINUED | OUTPATIENT
Start: 2024-04-26 | End: 2024-04-29 | Stop reason: HOSPADM

## 2024-04-26 RX ORDER — ONDANSETRON 4 MG/1
4 TABLET, ORALLY DISINTEGRATING ORAL ONCE
Status: COMPLETED | OUTPATIENT
Start: 2024-04-26 | End: 2024-04-26

## 2024-04-26 RX ORDER — PRENATAL VIT/IRON FUM/FOLIC AC 27MG-0.8MG
1 TABLET ORAL DAILY
Status: DISCONTINUED | OUTPATIENT
Start: 2024-04-26 | End: 2024-04-29 | Stop reason: HOSPADM

## 2024-04-26 RX ADMIN — LITHIUM CARBONATE 150 MG: 150 CAPSULE, GELATIN COATED ORAL at 16:06

## 2024-04-26 RX ADMIN — LITHIUM CARBONATE 150 MG: 150 CAPSULE, GELATIN COATED ORAL at 09:00

## 2024-04-26 RX ADMIN — ONDANSETRON 4 MG: 4 TABLET, ORALLY DISINTEGRATING ORAL at 16:07

## 2024-04-26 RX ADMIN — ONDANSETRON 4 MG: 4 TABLET, ORALLY DISINTEGRATING ORAL at 07:51

## 2024-04-26 RX ADMIN — QUETIAPINE FUMARATE 50 MG: 50 TABLET, EXTENDED RELEASE ORAL at 21:00

## 2024-04-26 RX ADMIN — PRENATAL VIT W/ FE FUMARATE-FA TAB 27-0.8 MG 1 TABLET: 27-0.8 TAB at 09:45

## 2024-04-26 NOTE — GROUP NOTE
Date: 4/26/2024    Group Start Time: 1200  Group End Time: 1250  Group Topic: Psychoeducation    [facility] 3W Yoly Kinney    \"Drive\" by Incubus Allyn Analysis and Discussion  Patients will listen to \"Drive\" by Incubus and discuss relevant lyrics/themes they connect with. Patients will identify things that are in their control and out of their control. Patients will be encouraged to share with the group and discuss how to have more control over their future.    Coping Skills and Grounding Techniques  Patients will discuss and learn different grounding techniques and coping skills in the context of managing stress when it comes to things out of our control. Patients will engage in grounding techniques as a group and discuss the experience after.     Focus: Creativity, Coping Skills, and Insight / Self-Awareness    Goals: Improve Expressive Communication/Self-Expression, Improve Self-Esteem, Decrease Negative Self-Talk/Cognitive Distortions, Improve Mood, Increase Group Cohesion/Socialization, Develop Coping Skills, Improve Self-Awareness/Insight, Increase Attention to Task, Decrease Impulsive Behaviors    Patient listened to recorded music and engaged in song discussion with peers. Patient resonated with the lyrics \"But lately I'm beginning to find that I should be the one behind the wheel\" and elaborated that she doesn't always feel \"in control of emotions\", but rather is influenced by fear. Patient reflected that she wants to focus on how she reacts to her emotions. Patient identified things in her control (\"my future\", who I want to be/who I become) and out of her control (\"how I feel\"), and provided support/validation to peers. Patient engaged in deep breathing and grounding techniques with the group, and reported feeling more relaxed/tired following group. Patient took notes on coping skills that were new to her.     Attended: 3/4-full attendance  Participation Level: Active Listener and

## 2024-04-26 NOTE — CARE COORDINATION
FAMILY COLLATERAL NOTE    Family/Support Name: Slime  Contact #: [phone]   Relationship to Pt:: Mom        Family/Support contact aware of hospitalization: Yes  Presenting Symptoms/Current Concerns:  AH, stress, history of self-harm     Top 3 Life Stressors:   Pricey utilities and possibility of them being shut off.   Difficulties at work      Background History Relevant to Current Hospitalization:  Pt has been having problems at work with coworkers and it was stressing her out. Then the family began having issues with their utilities. Water bill was $800 per month and the electric company has been trying to turn off the power if family doesn't pay $500. Pt couldn't handle the stress. Utility situation has since been figured out.     Pt has a history of cutting. No previous admissions. Had a counselor when she was young. Pt has reported AH in the past and prior to admission. Unknown if any paranoia. \"She just can't handle stress well at all.\" Mom reports she has always been this way. \"She's a very sensitive person.\"     Mom reports family will keep an eye on pt at all times. There are enough people in the home, \"she will never be out of sight.\"     Family Mental Health/Substance Use History:   \"I'm sure there is\"    Support Network's Goal for Hospitalization:   \"Get a better hold on herself and get herself right\"  Discharge Plan:   Return home with mom     Support Network Supportive of Discharge Plan:   Yes    Support can confirm Safety of Location and Security of Weapons:   No weapons in the home    Support agreeable to Safeguard and Monitor Medications (including Prescription and OTC):   Agreeable  Identified Barriers to Compliance with Discharge Plan:   Finances, stress  Recommendations for Support Network:   Be available for follow up calls.      Zarina Rendon

## 2024-04-26 NOTE — CARE COORDINATION
Brief Intervention and Referral to Treatment Summary    Patient was provided PHQ-9, AUDIT-C and DAST Screening:      PHQ-9 Score: 0  AUDIT-C Score:  0  DAST Score:  1    Patient’s substance use is considered     Low Risk/Healthy      Patient’s depression is considered:     Minimal    Brief Education Was Provided    Patient was receptive      Brief Intervention Is Provided (Only for AUDIT-C or DAST)     Patient reports readiness to decrease and/or stop use and a plan was discussed x  Patient denies readiness to decrease and/or stop use and a plan was not discussed      IRecommendations/Referrals for Brief and/or Specialized Treatment Provided to Patient:  Patient was smoking marijuana daily until her third trimester and states she has only used 2x for auditory hallucinations.

## 2024-04-26 NOTE — GROUP NOTE
Date: 4/26/2024    Group Start Time: 0940  Group End Time: 1050  Group Topic: Music Therapy    [facility] 3[facility] Yoly Kinney    Active Music Making, Live Music Listening, and Music Reminiscence  Patients will be given a songbook and offered the opportunity to select (a) song(s) of their choice for live music listening on [employer]. Patients will be encouraged to sing along, move along, and listen to the music. Patients will be asked to reflect on the potential benefits of engaging with preferred live music.     Focus: Self-Expression, Processing, Self-Esteem, Coping Skills, and Building Positive Experiences    Goals: Improve Mood, Decrease Isolation, Increase Sense of Community/Socialization, Improve Self-Esteem, Increase Self-Expression, Provide Sense of Autonomy, Develop Coping Skills    Patient selected several songs for live music listening and sang/moved to familiar music. Patient expressed her emotions through singing and crying appropriately. Patient verbalized support for peers' song choices and was somewhat social when approached.     Attended: 1/2-3/4 attendance and With staff  Participation Level: Active Listener and Interactive  Participation Quality: Attentive, Sharing, and Supportive  Affect/Mood: Constricted/Brightens  Speech: normal rate and normal volume   Thought Content/Processes: Linear  Level of consciousness: Alert  Response: Able to verbalize current knowledge/experience and Able to verbalize/acknowledge new learning  Outcomes: Successfully internalized the purpose of intervention and Actively participated in the group experience    Discipline Responsible: Music Therapist  Signature: Yoly Carr, MT-BC, PsychEd Spec

## 2024-04-26 NOTE — CONSULTS
HISTORY AND PHYSICAL             Date: 4/26/2024        Patient Name: Liane Gomez     YOB: 2000      Age:  23 y.o.    Chief Complaint     Chief Complaint   Patient presents with    Psychiatric Evaluation          History Obtained From   patient, electronic medical record    History of Present Illness   Liane Gomez is a 23-year-old female 28 weeks pregnant admitted for auditory hallucinations and thoughts of wanting to harm herself.  Patient denies suicide ideation.  Per EMR patient reportedly stopped taking her medications 3 weeks ago and did not inform her OB doctor of this.  Patient is seen at [facility] Dr. Carlita Pedroza.  Patient reports OB appointment is 45 minutes away from where she lives in [place].  She may have issues getting to this appointment. Denies any complications with pregnancy.  Patient denies chest pain, palpitations, lightheadedness, headache, dizziness, shortness of breath, cough, N/V/D, and changes in appetite.  Patient also denies smoking, illicit drug, and alcohol use.  Denies self-inflicted injuries or wounds.  She does complain of nausea likely due to hyperemesis gravis.  Positive for cannabis.  Patient has a past medical history of asthma. hospitalist consulted for evaluation of acute and chronic disease with recommendations.  Past Medical History     Past Medical History:   Diagnosis Date    Asthma     Stomach ulcer     UTI (urinary tract infection)         Past Surgical History   History reviewed. No pertinent surgical history.     Medications Prior to Admission     Prior to Admission medications    Medication Sig Start Date End Date Taking? Authorizing Provider   ondansetron (ZOFRAN) 4 MG tablet Take 1 tablet by mouth every 8 hours as needed for Nausea  Patient not taking: Reported on 4/24/2024 11/28/23   Reinaldo Richardson MD   acetaminophen (TYLENOL) 500 MG tablet Take 1 tablet by mouth 4 times daily as needed for Pain  Patient not taking: Reported on 4/24/2024 
of breathing, good air exchange, clear to auscultation bilaterally, no crackles or wheezing  Heart:  Normal apical impulse, regular rate and rhythm, normal S1 and S2, no S3 or S4, and no murmur noted  Abdomen:  Normal bowel sounds, soft, non-distended, non-tender, gravid, +FM  Fetal heart rate:  Baseline Heart Rate 135   by Doptones  Pelvis: Deferred    DATA:  2/9/2024 9:52 am     TECHNIQUE:  Transabdominal and endovaginal sonographic evaluation of the gravid uterus  was performed.     COMPARISON:  11/28/2023.     HISTORY:  ORDERING SYSTEM PROVIDED HISTORY: History of maternal syphilis, currently  pregnant, second trimester  TECHNOLOGIST PROVIDED HISTORY:  What reading provider will be dictating this exam?->CRC     FINDINGS:  A single live intrauterine pregnancy is identified in cephalic position.  Somatic fetal movement is observed.  The fetal heart rate is 147 beats per  minute.  The fetal cerebral ventricles, choroid plexus, posterior fossa,  cerebellum, four-chamber heart, stomach, kidneys, urinary bladder, spine, all  limbs, three-vessel cord, and cord insertion are visualized.  There is  suboptimal visualization of the fetal ventricular outflow tracts.  Standard  fetal measurements were obtained and correspond to an estimated gestational  age of 19 weeks and 3 days.  The estimated current fetal weight is 282 g  which correlates to 43rd percentile based upon last menstrual period or this  pregnancy's baseline ultrasound.  The placenta is anterior in position.  The  lower margin of the placenta is 6.2 cm from the internal os.  A color image  demonstrating no vasa previa was obtained.  The cervix measures 3.6 cm in  length.  Amniotic fluid volume is subjectively within normal limits.     IMPRESSION:  Single live intrauterine pregnancy with an estimated gestational age of 19  weeks and 3 days.     EFW is 282 g.     Cervical length is 3.6 cm.              Specimen Collected: 02/09/24 10:14 EST Last Resulted:

## 2024-04-26 NOTE — GROUP NOTE
Date: 4/26/2024    Group Start Time: 1435  Group End Time: 1525  Group Topic: Music Therapy    [facility] 3W Yoly Kinneyoparliz  Patients will be asked to select a category/difficulty level for music-based questions in the style of \"Jeopardy!\". Patients will be asked to identify a song title, artist, movie, TV show, etc. after listening to a recorded version of a song. Patients will choose between competing in teams or working together as a group.    Focus: Building Positive Experiences, Socialization  Goals: Improve/Maintain Cognitive Skills, Increase Socialization, Improve Mood, Increase Self-Esteem, Increase Self-Expression, Promote A Sense of Autonomy, Improve Attention to Task, Decrease Impulsive Behaviors     Patient collaborated with peers to answer music-based trivia questions, and answered relevantly to prompts. Patient socialized with peers and was bright throughout group AEB smiling, laughing, and initiating conversation with peers. Patient sang along to familiar music.     Attended: Full attendance  Participation Level: Active Listener and Interactive  Participation Quality: Attentive, Sharing, and Supportive  Affect/Mood: Bright/Euthymic and Brightens  Speech: spontaneous, normal rate, and normal volume   Response: Able to verbalize current knowledge/experience  Outcomes: Successfully internalized the purpose of intervention and Actively participated in the group experience  Modes of Intervention: Music-Based Cognitive Activity    Discipline Responsible: Music Therapist  Signature: MERARI Mancera, PsychEd Spec

## 2024-04-26 NOTE — CARE COORDINATION
Psychosocial Assessment    Current Level of Psychosocial Functioning     Independent x  Dependent    Minimal Assist     Comments:  Lives with mom, step-mom, jayna, and two younger brothers.     Psychosocial High Risk Factors (check all that apply)    Unable to obtain meds   Chronic illness/pain    Substance abuse   Lack of Family Support   Financial stress   Isolation   Inadequate Community Resources  Suicide attempt(s)  Not taking medications   Victim of crime   Developmental Delay  Unable to manage personal needs    Age 65 or older   Homeless  No transportation   Readmission within 30 days  Unemployment  Traumatic Event    Family/Supports identified: mom Slime Richards #[phone]    Sexual Orientation:  did not identify    Patient Strengths:good support system, open to tx    Patient Barriers: no income ( loss of job ), no electric in home, not connected with outpatient    Safety plan:completed    CMHC/MH history:would like case reopened at [facility]    Plan of Care:  medication management, group/individual therapies, family meetings, psycho -education, treatment team meetings to assist with stabilization    Initial Discharge Plan:  call mom for collateral    Clinical Summary:  Patient stated that she ws hearing voices of people screaming in her head and had thoughts of self hearm. Reports SA when 19 years of age , took pills. Last self harm was 4-5 years ago. Has multiple stressors including pregnancy, loss of job from calling in sick, no electric in the home. Patient reports hx of an eating dx that she has never received tx for. States food taste rotten and cannot keep it down. Currently denies SI, denies AH. Wants to feel better. Provided with resources for help with utilities and resource mothers pamphlet.

## 2024-04-27 PROCEDURE — 1240000000 HC EMOTIONAL WELLNESS R&B

## 2024-04-27 PROCEDURE — 2500000003 HC RX 250 WO HCPCS: Performed by: REGISTERED NURSE

## 2024-04-27 PROCEDURE — 6370000000 HC RX 637 (ALT 250 FOR IP): Performed by: REGISTERED NURSE

## 2024-04-27 PROCEDURE — 6370000000 HC RX 637 (ALT 250 FOR IP): Performed by: PSYCHIATRY & NEUROLOGY

## 2024-04-27 RX ADMIN — ONDANSETRON 4 MG: 4 TABLET, ORALLY DISINTEGRATING ORAL at 06:22

## 2024-04-27 RX ADMIN — MICONAZOLE NITRATE: 2 POWDER TOPICAL at 21:38

## 2024-04-27 RX ADMIN — LITHIUM CARBONATE 150 MG: 150 CAPSULE, GELATIN COATED ORAL at 09:24

## 2024-04-27 RX ADMIN — PRENATAL VIT W/ FE FUMARATE-FA TAB 27-0.8 MG 1 TABLET: 27-0.8 TAB at 09:24

## 2024-04-27 RX ADMIN — LITHIUM CARBONATE 150 MG: 150 CAPSULE, GELATIN COATED ORAL at 17:06

## 2024-04-27 RX ADMIN — QUETIAPINE FUMARATE 50 MG: 50 TABLET, EXTENDED RELEASE ORAL at 21:38

## 2024-04-27 NOTE — GROUP NOTE
Group Therapy Note    Date: 4/26/2024    Group Start Time: 2000  Group End Time: 2030  Group Topic: Wrap-Up    [facility] Claudia Dempsey RN; Mary Viera RN        Group Therapy Note    Attendees: 9/10       Patient's Goal:  \"to feel better about my situation\"    Notes:  Goal Met, patient reports feeling \"more hopeful\"     Status After Intervention:  Improved    Participation Level: Active Listener and Interactive    Participation Quality: Appropriate, Attentive, and Sharing      Speech:  normal      Thought Process/Content: Logical      Affective Functioning: Congruent      Mood: euthymic      Level of consciousness:  Alert, Oriented x4, and Attentive      Response to Learning: Able to verbalize current knowledge/experience and Capable of insight      Endings: None Reported    Modes of Intervention: Support and Socialization      Discipline Responsible: Registered Nurse      Signature:  Claudia Sanon RN

## 2024-04-27 NOTE — GROUP NOTE
Group Therapy Note    Date: 4/27/2024    Group Start Time: 1200  Group End Time: 1245  Group Topic: Cognitive Skills    [facility]    , LEESA Steel        Group Therapy Note    Attendees: 5/10         Notes:  Active participant, appropriate behavior.     Status After Intervention:  Improved    Participation Level: Interactive    Participation Quality: Appropriate, Attentive, Sharing, and Supportive      Speech:  normal      Thought Process/Content: Logical      Affective Functioning: Congruent      Mood: euthymic      Level of consciousness:  Alert and Oriented x4      Response to Learning: Progressing to goal      Endings: None Reported    Modes of Intervention: Education, Support, and Problem-solving      Discipline Responsible: Registered Nurse      Signature:  Milvia Chavez, RN

## 2024-04-28 PROCEDURE — 1240000000 HC EMOTIONAL WELLNESS R&B

## 2024-04-28 PROCEDURE — 6370000000 HC RX 637 (ALT 250 FOR IP): Performed by: REGISTERED NURSE

## 2024-04-28 PROCEDURE — 6370000000 HC RX 637 (ALT 250 FOR IP): Performed by: PSYCHIATRY & NEUROLOGY

## 2024-04-28 RX ADMIN — ONDANSETRON 4 MG: 4 TABLET, ORALLY DISINTEGRATING ORAL at 07:01

## 2024-04-28 RX ADMIN — MICONAZOLE NITRATE: 2 POWDER TOPICAL at 08:18

## 2024-04-28 RX ADMIN — MICONAZOLE NITRATE: 2 POWDER TOPICAL at 20:17

## 2024-04-28 RX ADMIN — PRENATAL VIT W/ FE FUMARATE-FA TAB 27-0.8 MG 1 TABLET: 27-0.8 TAB at 08:18

## 2024-04-28 RX ADMIN — LITHIUM CARBONATE 150 MG: 150 CAPSULE, GELATIN COATED ORAL at 08:19

## 2024-04-28 RX ADMIN — QUETIAPINE FUMARATE 50 MG: 50 TABLET, EXTENDED RELEASE ORAL at 20:16

## 2024-04-28 RX ADMIN — LITHIUM CARBONATE 150 MG: 150 CAPSULE, GELATIN COATED ORAL at 16:18

## 2024-04-28 NOTE — GROUP NOTE
Group Therapy Note    Date: 4/28/2024    Group Start Time: 1000  Group End Time: 1050  Group Topic: Art Therapy     [facility] Snoya Snyder, CHICHIW        Group Therapy Note    Attendees: 6       Patient's Goal:  To stay positive.     Notes:  Patient attended the morning group art therapy session. She demonstrated an interest in chalk pastels. Patient created a beautiful rendering of an out of doors scene. She called it \"my happy place\". Patient seemed to benefit from the opportunity to visually express feelings.     Status After Intervention:  Improved    Participation Level: Active Listener    Participation Quality: Appropriate      Speech:  normal      Thought Process/Content: Logical      Affective Functioning: Congruent      Mood: elevated      Level of consciousness:  Alert      Response to Learning: Able to verbalize current knowledge/experience      Endings: None Reported    Modes of Intervention: Activity      Discipline Responsible: Psychoeducational Specialist      Signature:  Sonya FLEMING

## 2024-04-28 NOTE — GROUP NOTE
Group Therapy Note    Date: 4/28/2024    Group Start Time: 1600  Group End Time: 1645  Group Topic: Healthy Living/Wellness    [facility]    , LEESA Steel; Ingrid Bryant RN        Group Therapy Note    Attendees: 8/16         Notes:  Appropriate behavior.     Status After Intervention:  Improved    Participation Level: Interactive    Participation Quality: Appropriate      Speech:  normal      Thought Process/Content: Logical      Affective Functioning: Congruent      Mood: euthymic      Level of consciousness:  Alert and Oriented x4      Response to Learning: Progressing to goal      Endings: None Reported    Modes of Intervention: Support and Socialization      Discipline Responsible: Registered Nurse      Signature:  Milvia Chavez, RN

## 2024-04-28 NOTE — CARE COORDINATION
Morning Community Meeting Topics    Wendi Gomez attended the morning community meeting on 4/28/24.    Topics discussed today     [x] Introduction  Day of the week and date  Mask distribution  Current mask requirements  [x]Teams  Explanation of  Green and Blue team criteria  Nurses assigned to each team for today  Explanation about green and blue paper  Date  Patient's Name  Patient's Nurse  Goals  [x] Visitation  Announce the visiting hours for the day  Announce which team is allowed to have visitors for the day  Review any updated Covid 19 requirements for visitors during visitation  Vaccine Card or negative Covid test within 48 hours of visit  State Identification  Patients are reminded to alert the  at least 1 hour before visitation   [x] Unit Orientation  Coffee use  Phone location and etiquette  Shower locations  Washer and dryer location and process  Common area expectations  Staff rounds expectation  [x] Meals   Educate patient to the menu  The patient is encouraged to fill out the menu to get preferences at mealtime  The patient is educated that if they do not fill out the menu, they will get the standard tray  The coffee pot is decaf, patient encouraged to order regular coffee from menu.  Educate patient to the meal process  Patient encouraged to eat snacks provided twice daily  Snacks may stay in patient room     [x] Discharge Process  Discharge expectations  Fill out the survey after discharge   [x] Hygiene  Daily showers encouraged  Showers availability discussed   Daily dressing encouraged  Discussed wearing street clothing  Education provided on where to place linens and clothing  Linens in the hamper  personal clothing does not go into the linen hamper  [x] Group   Patient encouraged to attend group provided  Time of Group Meetings discussed  Gentle reminder that attendance is a Physician order  [x] Movement  Chair exercises completed  Stretching completed  Notes:   Patient's goal is

## 2024-04-29 VITALS
RESPIRATION RATE: 16 BRPM | HEIGHT: 63 IN | TEMPERATURE: 98.1 F | OXYGEN SATURATION: 100 % | SYSTOLIC BLOOD PRESSURE: 127 MMHG | DIASTOLIC BLOOD PRESSURE: 69 MMHG | HEART RATE: 88 BPM | BODY MASS INDEX: 30.12 KG/M2 | WEIGHT: 170 LBS

## 2024-04-29 LAB — LITHIUM SERPL-MCNC: <0.1 MEQ/L (ref 0.6–1.2)

## 2024-04-29 PROCEDURE — 6370000000 HC RX 637 (ALT 250 FOR IP): Performed by: REGISTERED NURSE

## 2024-04-29 PROCEDURE — 80178 ASSAY OF LITHIUM: CPT

## 2024-04-29 PROCEDURE — 99239 HOSP IP/OBS DSCHRG MGMT >30: CPT | Performed by: PSYCHIATRY & NEUROLOGY

## 2024-04-29 PROCEDURE — 36415 COLL VENOUS BLD VENIPUNCTURE: CPT

## 2024-04-29 PROCEDURE — 6370000000 HC RX 637 (ALT 250 FOR IP): Performed by: PSYCHIATRY & NEUROLOGY

## 2024-04-29 RX ORDER — LITHIUM CARBONATE 150 MG/1
150 CAPSULE ORAL 2 TIMES DAILY WITH MEALS
Qty: 30 CAPSULE | Refills: 3 | Status: SHIPPED | OUTPATIENT
Start: 2024-04-29

## 2024-04-29 RX ORDER — PRENATAL VIT/IRON FUM/FOLIC AC 27MG-0.8MG
1 TABLET ORAL DAILY
Qty: 30 TABLET | Refills: 0 | Status: SHIPPED | OUTPATIENT
Start: 2024-04-29

## 2024-04-29 RX ORDER — QUETIAPINE FUMARATE 50 MG/1
50 TABLET, EXTENDED RELEASE ORAL NIGHTLY
Qty: 15 TABLET | Refills: 2 | Status: SHIPPED | OUTPATIENT
Start: 2024-04-29

## 2024-04-29 RX ADMIN — PRENATAL VIT W/ FE FUMARATE-FA TAB 27-0.8 MG 1 TABLET: 27-0.8 TAB at 09:29

## 2024-04-29 RX ADMIN — MICONAZOLE NITRATE: 2 POWDER TOPICAL at 09:51

## 2024-04-29 RX ADMIN — LITHIUM CARBONATE 150 MG: 150 CAPSULE, GELATIN COATED ORAL at 09:29
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
## 2024-04-29 NOTE — DISCHARGE INSTR - DIET

## 2024-04-29 NOTE — GROUP NOTE
Group Therapy Note    Date: 4/29/2024    Group Start Time: 1000  Group End Time: 1100  Group Topic: Art Therapy     [facility] Sonya Snyder, CHICHIW        Group Therapy Note    Attendees: 11       Patient's Goal:  To go home.     Notes:  Patient attended the morning group art therapy session. She determined she wished to paint this session. Patient created an non-representational image with bright colors. She said she was going to add positive affirmations to the picture. Patient demonstrated a positive attitude during the session.     Status After Intervention:  Improved    Participation Level: Active Listener    Participation Quality: Appropriate      Speech:  normal      Thought Process/Content: Logical      Affective Functioning: Congruent      Mood: elevated      Level of consciousness:  Alert      Response to Learning: Able to verbalize current knowledge/experience      Endings: None Reported    Modes of Intervention: Activity      Discipline Responsible: Psychoeducational Specialist      Signature:  Sonya FLEMING
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
## 2024-04-29 NOTE — DISCHARGE INSTRUCTIONS
Due to the Covid-19 Pandemic, [facility] is not currently available. For assistance with quitting smoking please go to https://smokefree.gov. A prescription for an FDA-approved tobacco cessation medication was offered at discharge and the patient refused.    Keep all follow up appointments, take medications as ordered, utilize positive supports, abstain from use of alcohol and drugs. If symptoms return or you feel at risk to yourself or others, please call 911, return the nearest emergency room, or call your local crisis hotline:  [facility]: [phone]  [facility]: [phone]  [facility]: [phone]    Someone from [facility] will be calling you tomorrow to follow up on your care. If you don't hear from us, give us a call! [phone].

## 2024-04-29 NOTE — GROUP NOTE
Date: 4/29/2024    Group Start Time: 0905  Group End Time: 0935  Group Topic: Music Therapy    ML 3W Yoly Kinney    Allyn Analysis and Song Discussions  Patients will listen to the song \"I Choose\" by Quiana Foster and discuss lyrics/interpretations derived from the song. Patients will identify things they can choose to support their physical needs, emotional needs, and social needs.     Focus: Coping Skills and Validation/Support    Goals: Improve Mood, Improve Insight/Self-Awareness, Increase Socialization/Community Building, Increase Self-Expression, Improve Attention to Task, Improve Coping Skills/Develop Coping Skills     Patient's Goal: \"go home\"  Attendance: Less than 1/4 attendance, With staff, and Anticipated discharge today    Patient was pulled from group several times for discharge preparation.     Discipline Responsible: Music Therapist  Signature: Yoly Carr MT-BC, PsychEd Spec

## 2024-04-29 NOTE — TRANSITION OF CARE
Behavioral Health Transition Record to Provider    Patient Name: Wendi Gomez  YOB: 2000   Medical Record Number: [id number]  Date of Admission: 4/24/2024  3:48 PM   Date of Discharge: 4/29/2024    Attending Provider: Jakub Hoffman MD   Discharging Provider: Jakub Hoffman MD  To contact this individual call [phone] and ask the  to page.  If unavailable, ask to be transferred to Behavioral Health Provider on call.  A Behavioral Health Provider will be available on call 24/7 and during holidays.    Primary Care Provider: [facility], MD    Allergies   Allergen Reactions    Blueberry        Reason for Admission: Pt was at [facility], assessed and sent to the ER. Pt is hearing command voices that tell her to kill herself, until recently were just loud voices she couldn't understand what they were saying She did not want to kill herself but the voices were telling her to take pills or cut her wrist to kill herself.     Admission Diagnosis: Depression, unspecified depression type [F32.A]  Depression, unspecified [F32.A]    * No surgery found *    Results for orders placed or performed during the hospital encounter of 04/24/24   COVID-19, Rapid    Specimen: Nasopharyngeal Swab   Result Value Ref Range    SARS-CoV-2, NAAT Not Detected Not Detected   Acetaminophen Level   Result Value Ref Range    Acetaminophen Level <5 (L) 10 - 30 ug/mL   CBC with Auto Differential   Result Value Ref Range    WBC 12.0 (H) 4.8 - 10.8 K/uL    RBC 3.95 (L) 4.20 - 5.40 M/uL    Hemoglobin 11.8 (L) 12.0 - 16.0 g/dL    Hematocrit 34.3 (L) 37.0 - 47.0 %    MCV 86.8 79.4 - 94.8 fL    MCH 29.9 27.0 - 31.3 pg    MCHC 34.4 33.0 - 37.0 %    RDW 13.0 11.5 - 14.5 %    Platelets 278 130 - 400 K/uL    Neutrophils % 79.3 %    Lymphocytes % 14.7 %    Monocytes % 4.6 %    Eosinophils % 0.8 %    Basophils % 0.3 %    Neutrophils Absolute 9.5 (H) 1.4 - 6.5 K/uL    Lymphocytes Absolute 1.8 1.0 - 4.8

## 2024-04-29 NOTE — PROGRESS NOTES
[facility]  BEHAVIORAL HEALTH FOLLOW-UP NOTE       4/26/2024     Patient was seen and examined in person, Chart reviewed   Patient's case discussed with staff/team    Chief Complaint: Depression Psycosis    Interim History:     Pt feel calmer than yesterday  Less mood swings and impulsive tendencies  Pt has been seeing OB doctor Dr Feldman- who signed her off to anyi [facility] who is 45 minutes away. Pt prefer to see somebody at [facility]  Pt agreeable to see out OB doctor  Pt is tolerating the medication well  Less racing thoughts  No active SI  AH- less intense    Appetite:   [] Normal/Unchanged  [] Increased  [x] Decreased      Sleep:       [] Normal/Unchanged  [x] Fair       [] Poor              Energy:    [] Normal/Unchanged  [] Increased  [x] Decreased        SI [x] Present  [] Absent    HI  []Present  [] Absent     Aggression:  [] yes  [] no    Patient is [] able  [x] unable to CONTRACT FOR SAFETY     PAST MEDICAL/PSYCHIATRIC HISTORY:   Past Medical History:   Diagnosis Date    Asthma     Stomach ulcer     UTI (urinary tract infection)        FAMILY/SOCIAL HISTORY:  History reviewed. No pertinent family history.  Social History     Socioeconomic History    Marital status: Single     Spouse name: Not on file    Number of children: Not on file    Years of education: Not on file    Highest education level: Not on file   Occupational History    Not on file   Tobacco Use    Smoking status: Every Day     Current packs/day: 0.50     Types: Cigarettes    Smokeless tobacco: Never   Vaping Use    Vaping Use: Former    Substances: N/A former    Devices: N/A former    Passive vaping exposure: Yes   Substance and Sexual Activity    Alcohol use: Not Currently     Comment: 2 years ago last alcohol usage    Drug use: Yes     Types: Marijuana (Weed)    Sexual activity: Yes     Partners: Male   Other Topics Concern    Not on file   Social History Narrative    Not on file     Social Determinants of Health 
      Behavioral Services  Medicare Certification Upon Admission    I certify that this patient's inpatient psychiatric hospital admission is medically necessary for:    [x] (1) Treatment which could reasonably be expected to improve this patient's condition,       [x] (2) Or for diagnostic study;     AND     [x](2) The inpatient psychiatric services are provided while the individual is under the care of a physician and are included in the individualized plan of care.    Estimated length of stay/service 3-5    Plan for post-hospital care OP care    Electronically signed by HELEN MOREL MD on 4/25/2024 at 11:42 AM      
      [facility]  BEHAVIORAL HEALTH FOLLOW-UP NOTE       4/27/2024     Patient was seen and examined in person, Chart reviewed   Patient's case discussed with staff/team    Chief Complaint: Depression Psycosis    Interim History:     Patient said she was feeling \"good\". She said she was feeling like she was sleeping \"a little too well\", because she felt \"tired and exhausted\", but her appetite was \"great\" (and then said she has Hyperemesis Gravidarum and has difficulty holding her food down). She said her mood was \"pretty good\", but a little anxious. She denied having suicidal or homicidal ideation. She denied having auditory or visual hallucinations. She is still a little paranoid though; she said she feels that \"people are always out to get me\".     Appetite:   [x] Normal/Unchanged  [] Increased  [] Decreased      Sleep:       [] Normal/Unchanged  [x] Fair       [] Poor              Energy:    [] Normal/Unchanged  [] Increased  [x] Decreased        SI [] Present  [x] Absent    HI  []Present  [x] Absent     Aggression:  [] yes  [x] no    Patient is [] able  [x] unable to CONTRACT FOR SAFETY     PAST MEDICAL/PSYCHIATRIC HISTORY:   Past Medical History:   Diagnosis Date    Asthma     Stomach ulcer     UTI (urinary tract infection)        FAMILY/SOCIAL HISTORY:  History reviewed. No pertinent family history.  Social History     Socioeconomic History    Marital status: Single     Spouse name: Not on file    Number of children: Not on file    Years of education: Not on file    Highest education level: Not on file   Occupational History    Not on file   Tobacco Use    Smoking status: Every Day     Current packs/day: 0.50     Types: Cigarettes    Smokeless tobacco: Never   Vaping Use    Vaping Use: Former    Substances: N/A former    Devices: N/A former    Passive vaping exposure: Yes   Substance and Sexual Activity    Alcohol use: Not Currently     Comment: 2 years ago last alcohol usage    Drug use: Yes     
CLINICAL PHARMACY NOTE: MEDS TO BEDS    Total # of Prescriptions Filled: 3   The following medications were delivered to the patient:  Lithium Carb 150 mg Cap  Prenatal 27-0.8 mg Tab  Quetiapine Er 50 mg Tab    Additional Documentation:    
Comprehensive Nutrition Assessment    Type and Reason for Visit:  Initial, Positive Nutrition Screen (+ malnutrition screen)    Nutrition Recommendations/Plan:   Continue General diet as tolerated, allow between meal and HS snacks  Encourage small , frequent meals of bland, easily digestable foods for management of nausea, cool or cold foods may be better tolerated  Continue with zofran as ordered  .obtain actual wt for monitoring     Malnutrition Assessment:  Malnutrition Status:  No malnutrition (04/26/24 1252)        Nutrition Assessment:     Nutrition Referral received for decreased oral intake & / or reported weight loss . Unable to assess for weight changes, no  pre-pregnancy weights in EMR, Recommend weekly weights for monitoring.   No malnutrition present based on available information. Pt currently admitted on behavioral health floor. Anticipate improvement in oral intake with inpatient psychiatric treatment ,medication compliance &/or  abstinence from substance abuse. General diet appropriate at this time. Actual weight ordered for further assessment. please consult dietitain if there is a change in condition or additional needs arise.      Nutrition Related Findings:    \" 28 weeks pregnant admitted for auditory hallucinations and thoughts of wanting to harm herself. . reportedly stopped taking her medications 3 weeks ago..She does complain of nausea likely due to hyperemesis gravis.  Positive for cannabis.  Patient has a past medical history of asthma. hospitalist consulted for evaluation of acute and chronic disease with recommendations.\"         Current Nutrition Intake & Therapies:    Average Meal Intake: Unable to assess  Average Supplements Intake: None Ordered  ADULT DIET; Regular    Anthropometric Measures:  Height: 160 cm (5' 3\")  Ideal Body Weight (IBW): 115 lbs (52 kg)    Admission Body Weight: 77.1 kg (170 lb) (stated)  Current Body Weight: 77.1 kg (170 lb), 147.8 % IBW. Weight Source: 
Discharge instructions reviewed verbally and in writing including f/u appointments. Patient verbalizes understanding and signed as such. All belongings returned for discharge.  Patient denies SI, HI, A/V hallucinations, mood is stable.   
PT. ATTENDED AND PARTICIPATED IN [id number] A.M ACTIVITY GROUP.        Electronically signed by Navya Howard on 4/27/2024 at 12:01 PM     
Patient attended and participated in wrap-up group   
Patient attended and participated in wrap-up group   
Patient out on unit, showered  attending groups, social with select peers,  Anxiety #7    depression #4  Denies all   Paranoid when out in public that people are watching her.  Safe at home and here  Lives with boyfriend os 9 months, 2 brothers, and 2 moms  No work, did not graduate due to her mental state  Appetite and sleep good,   Patient takes Zofran X2 a day to keep her nausea good  Likes to color , arts and crafts, patient says she is very creative  Goal is to stay positive  Main support is her boyfriend and mom    
Patient requesting ondansetron for hyperemesis due to pregnancy.  Reports she takes it daily.    
Pt left unit with staff, escorted to lobby and collected by family for ride home. Belongings given to pt.  Pt denies any current suicidal ideation, homicidal ideation or hallucinations.     Mood and affect stable.      Pt's mother Slime called and notified of OBGYN appointment.   [facility] with Dr Sepulveda Tuesday 5/28/24 at 10:30am.   
Pt medicated per request with PRN Zofran for nausea @ 9703.  
Pt noted the be wearing a nose ring and a ring which she gave staff willingly, items marked and placed in safe.  
Pt reports depression & anxiety are both #4/10,denies SI/HI/AVH. Pt is pleasant with staff, is visible on unit in DR, reports good appetite,Pt attends all groups, pt showered today, pt sleeps all night, takes nap during day.  
MORE EXERCISE BY WALKING MORE\".          Electronically signed by Navya Howard on 4/27/2024 at 9:38 AM   
1 patch, TransDERmal, Daily, Jakub Hoffman MD, 1 patch at 04/28/24 0819      Examination:  /61   Pulse 73   Temp 98.4 °F (36.9 °C) (Oral)   Resp 18   Ht 1.6 m (5' 3\")   Wt 77.1 kg (170 lb)   LMP 10/11/2023 (Approximate) Comment: Per pt she is 28 weeks pregnant  SpO2 100%   BMI 30.11 kg/m²   Gait - steady  Medication side effects(SE): no    Mental Status Examination:    Level of consciousness:  within normal limits   Appearance:  fair grooming and fair hygiene  Behavior/Motor:  psychomotor retardation improving  Attitude toward examiner:  cooperative, pleasant  Speech: with normal rate, rhythm   Mood: depression and anxiety improving  Affect: anxious, but improving  Thought processes:  slow   Thought content:  Delusions:  ideas of reference; denies current suicidal or homicidal ideation  Perceptual Disturbance:  had some auditory hallucinations yesterday, but not today  Cognition:  oriented to person, place, and time   Concentration intact  Insight fair   Judgement fair     ASSESSMENT:   Patient symptoms are:  [x] Well controlled  [] Improving  [] Worsening  [] No change      Diagnosis:   Principal Problem:    Bipolar affective disorder, mixed, severe, with psychotic behavior (HCC)  Active Problems:    Depression    30 weeks gestation of pregnancy    Hx of ectopic pregnancy    Marijuana use during pregnancy    Tobacco use in pregnancy, antepartum, third trimester    Asthma during pregnancy    Elevated blood pressure complicating pregnancy, antepartum, third trimester    Elevated BP without diagnosis of hypertension  Resolved Problems:    * No resolved hospital problems. *      LABS:    No results for input(s): \"WBC\", \"HGB\", \"PLT\" in the last 72 hours.    No results for input(s): \"NA\", \"K\", \"CL\", \"CO2\", \"BUN\", \"CREATININE\", \"GLUCOSE\" in the last 72 hours.    No results for input(s): \"BILITOT\", \"ALKPHOS\", \"AST\", \"ALT\" in the last 72 hours.    Lab Results   Component Value Date/Time    LABAMPH Neg

## 2024-05-06 ENCOUNTER — HOSPITAL ENCOUNTER (OUTPATIENT)
Age: 24
Discharge: HOME | End: 2024-05-06
Attending: OBSTETRICS & GYNECOLOGY | Admitting: OBSTETRICS & GYNECOLOGY
Payer: MEDICAID

## 2024-05-06 VITALS
OXYGEN SATURATION: 98 % | SYSTOLIC BLOOD PRESSURE: 130 MMHG | RESPIRATION RATE: 18 BRPM | DIASTOLIC BLOOD PRESSURE: 81 MMHG | HEART RATE: 92 BPM | TEMPERATURE: 98.3 F

## 2024-05-06 PROBLEM — O26.899 ABDOMINAL PAIN AFFECTING PREGNANCY: Status: ACTIVE | Noted: 2024-05-06

## 2024-05-06 PROBLEM — R10.9 ABDOMINAL PAIN AFFECTING PREGNANCY: Status: ACTIVE | Noted: 2024-05-06

## 2024-05-06 PROCEDURE — 99284 EMERGENCY DEPT VISIT MOD MDM: CPT | Performed by: OBSTETRICS & GYNECOLOGY

## 2024-05-06 PROCEDURE — 99283 EMERGENCY DEPT VISIT LOW MDM: CPT

## 2024-05-06 PROCEDURE — 59025 FETAL NON-STRESS TEST: CPT | Performed by: OBSTETRICS & GYNECOLOGY

## 2024-05-06 RX ORDER — ONDANSETRON 8 MG/1
1 TABLET, ORALLY DISINTEGRATING ORAL EVERY 8 HOURS PRN
COMMUNITY
Start: 2024-04-23

## 2024-05-07 NOTE — FLOWSHEET NOTE
Dr. Babin at patient bedside. SVE: closed and constipated. Plan: discussed ways to help decrease constipation.Lots of water,  \"Roughage\"  Prunes, fiber, stool softener; metamucil, colace...dulcalax and hemorrhoid cream. Soak in warm bath. Tylenol and heat compress to help with discomfort. Ok to discharge.

## 2024-05-07 NOTE — ED TRIAGE NOTES
Department of Obstetrics and Gynecology  Labor and Delivery  Attending Triage Note      SUBJECTIVE:  23 y.o.   @ 31w3d  Presents with complaints of   Chief Complaint   Patient presents with    Abdominal Pain     Sharp abdominal pains for 30 minutes.  Chasing 8 yo brother and carried him in the house, then started feeling pain.       Fetal Movement    Yes  ROM No  Vaginal Bleeding No  Contractions No    OBJECTIVE    Vitals:  /81   Pulse 92   Temp 98.3 °F (36.8 °C) (Oral)   Resp 18   LMP 10/11/2023 (Approximate) Comment: Per pt she is 28 weeks pregnant  SpO2 98%     CONSTITUTIONAL:  awake, alert, cooperative, no apparent distress, and appears stated age    Cervix:             Dilation:  Closed         Effacement:  Long         Station:  -5 cm         Consistency:  firm         Position:  posterior    Fetal Position:      Membranes:  Intact    Fetal heart rate:         Baseline Heart Rate:  150        Accelerations:  present       Decelerations:  none       Variability:  moderate  NST reactive. Duration 20 min. Appropriate accels for 31 weeks. No decels. Indication: abdomen pain  Contraction frequency: 0 minutes        DATA:  Labs Reviewed   URINALYSIS WITH REFLEX TO CULTURE   URINE DRUG SCREEN       ASSESSMENT & PLAN:   1) IUP at 30 weeks. Rectus muscle strain.   2) D/C to home  3) Keep appointment with OB provider    Abdulkadir Babin DO

## 2024-05-07 NOTE — PROGRESS NOTES
Patient arrived by squad with 18G IV in her left AC. Removed at this time with intact catheter. Dressing applied.

## 2024-05-07 NOTE — DISCHARGE INSTRUCTIONS
Follow up with OB provider.     To help decrease constipation: Drink Lots of water,  eat lots of  \"Ruffage, \"  leafy greens, salads, prunes, fiber, stool softener; metamucil, colace...dulcalax and hemorrhoid cream. Soak in warm bath. Tylenol and heat compress to help with discomfort. Ok to discharge.      Go to ED or call 911 for abdominal pain, water breaks, headache that does not go away after taking medication, vaginal bleeding...     Questions or concerns call OB provider office or OB [facility] triage [phone].

## 2024-05-28 ENCOUNTER — OFFICE VISIT (OUTPATIENT)
Dept: OBGYN CLINIC | Age: 24
End: 2024-05-28
Payer: MEDICAID

## 2024-05-28 VITALS
BODY MASS INDEX: 34.02 KG/M2 | HEIGHT: 63 IN | WEIGHT: 192 LBS | SYSTOLIC BLOOD PRESSURE: 112 MMHG | DIASTOLIC BLOOD PRESSURE: 72 MMHG

## 2024-05-28 DIAGNOSIS — Z34.00 ENCOUNTER FOR SUPERVISION PREGNANCY IN PRIMIGRAVIDA, ANTEPARTUM: ICD-10-CM

## 2024-05-28 DIAGNOSIS — O99.333 TOBACCO USE IN PREGNANCY, ANTEPARTUM, THIRD TRIMESTER: ICD-10-CM

## 2024-05-28 DIAGNOSIS — F31.9 BIPOLAR DISEASE DURING PREGNANCY, ANTEPARTUM (HCC): ICD-10-CM

## 2024-05-28 DIAGNOSIS — Z3A.34 34 WEEKS GESTATION OF PREGNANCY: ICD-10-CM

## 2024-05-28 DIAGNOSIS — Z3A.34 34 WEEKS GESTATION OF PREGNANCY: Primary | ICD-10-CM

## 2024-05-28 DIAGNOSIS — O99.340 BIPOLAR DISEASE DURING PREGNANCY, ANTEPARTUM (HCC): ICD-10-CM

## 2024-05-28 LAB
BILIRUB UR QL STRIP: NEGATIVE
CLARITY UR: CLEAR
COLOR UR: YELLOW
GLUCOSE UR STRIP-MCNC: NEGATIVE MG/DL
HGB UR QL STRIP: NEGATIVE
KETONES UR STRIP-MCNC: ABNORMAL MG/DL
LEUKOCYTE ESTERASE UR QL STRIP: NEGATIVE
NITRITE UR QL STRIP: NEGATIVE
PH UR STRIP: 6 [PH] (ref 5–9)
PROT UR STRIP-MCNC: ABNORMAL MG/DL
SP GR UR STRIP: 1.02 (ref 1–1.03)
UROBILINOGEN UR STRIP-ACNC: 0.2 E.U./DL

## 2024-05-28 PROCEDURE — 99204 OFFICE O/P NEW MOD 45 MIN: CPT | Performed by: OBSTETRICS & GYNECOLOGY

## 2024-05-28 NOTE — PROGRESS NOTES
Initial OB visit      Wendi Gomez is a 23 y.o. year old female  @34.4 weeks gestation she was dated by an ultrasound at 8 weeks and 6 days.  Patient has had prenatal care with [facility] and dentistry she was seen about 1 month ago she states she has done all of her preliminary blood work and cultures and she also did her 1 hour.  Patient was recently admitted to the hospital secondary to an acute changes in her psychotic status patient has a known history of bipolar affective disorder mixed severe she was seen by Dr. Yoly Araujo however has poor outpatient support    POB: Ectopic history    PGYN: No known history    PMH: Bipolar affective disorder mixed severe, marijuana and tobacco use disorder    PSH: No known history    MEDS: Prenatal vitamins lithium Seroquel    Drug Allergies: Blueberry allergy family also gives a history of some sensitivity to steroids    SOCHX: Positive marijuana and nicotine    FH: Mother or Father , DM No , HTN No, Other No    /72   Ht 1.6 m (5' 3\")   Wt 87.1 kg (192 lb)   LMP 10/11/2023 (Approximate) Comment: Per pt she is 28 weeks pregnant  BMI 34.01 kg/m²   Past Medical History:   Diagnosis Date    Asthma     Stomach ulcer     UTI (urinary tract infection)      No past surgical history on file.      Review of Systems  Constitutional: negative for anorexia, chills, and fatigue  Genitourinary:negative for genital lesions and vaginal discharge, dysuria, frequency, and hematuria, see above  Integument/breast: negative for dryness and pruritus  Behavioral/Psych: negative for abusive relationship, aggressive behavior, and anxiety  Endocrine: negative     All other systems reviewed and are negative.       Physical Exam:  Skin: Warm, dry, no lesions or rashes  Extremities: Without clubbing, cyanosis and edema. Palms and nails are normal. Ambulates without difficulty  Neurological: No gross sensory or motor deficits.  Abdomen: Soft, non-tender without masses or

## 2024-05-29 LAB
CLUE CELLS VAG QL WET PREP: ABNORMAL
T VAGINALIS VAG QL WET PREP: ABNORMAL
TRICHOMONAS VAGINALIS SCREEN: NEGATIVE
YEAST VAG QL WET PREP: ABNORMAL

## 2024-05-29 RX ORDER — FLUCONAZOLE 150 MG/1
TABLET ORAL
Qty: 3 TABLET | Refills: 0 | Status: SHIPPED | OUTPATIENT
Start: 2024-05-29

## 2024-05-30 LAB — BACTERIA UR CULT: NORMAL

## 2024-05-31 LAB
6MAM UR QL: NOT DETECTED
7-AMINOCLONAZEPAM: NOT DETECTED
ALPHA-OH-ALPRAZOLAM: NOT DETECTED
ALPHA-OH-MIDAZOLAM, URINE: NOT DETECTED
ALPRAZOLAM: NOT DETECTED
AMPHET UR QL SCN: NOT DETECTED
BARBITURATES: NEGATIVE
BENZOYLECGONINE: NEGATIVE
BUPRENORPHINE: NOT DETECTED
C TRACH DNA CVX QL NAA+PROBE: NEGATIVE
CARISOPRODOL UR QL: NEGATIVE
CLONAZEPAM UR QL: NOT DETECTED
CODEINE: NOT DETECTED
CREAT UR-MCNC: 143.7 MG/DL (ref 20–400)
DIAZEPAM: NOT DETECTED
ETHYL GLUCURONIDE: NEGATIVE
FENTANYL UR QL: NOT DETECTED
GABAPENTIN: NOT DETECTED
HYDROCODONE UR QL: NOT DETECTED
HYDROMORPHONE: NOT DETECTED
LORAZEPAM UR QL: NOT DETECTED
MARIJUANA METABOLITE: NORMAL
MDA: NOT DETECTED
MDEA: NOT DETECTED
MDMA UR QL: NOT DETECTED
MEPERIDINE: NOT DETECTED
METHADONE: NEGATIVE
METHAMPHETAMINE: NOT DETECTED
METHYLPHENIDATE: NOT DETECTED
MIDAZOLAM UR QL SCN: NOT DETECTED
MORPHINE: NOT DETECTED
N GONORRHOEA DNA CERV MUCUS QL NAA+PROBE: NEGATIVE
NALOXONE: NOT DETECTED
NORBUPRENORPHINE, FREE: NOT DETECTED
NORDIAZEPAM: NOT DETECTED
NORFENTANYL: NOT DETECTED
NORHYDROCODONE, URINE: NOT DETECTED
NOROXYCODONE: NOT DETECTED
NOROXYMORPHONE, URINE: NOT DETECTED
OXAZEPAM UR QL: NOT DETECTED
OXYCODONE UR QL: NOT DETECTED
OXYMORPHONE UR QL: NOT DETECTED
PAIN MANAGEMENT DRUG PANEL: NORMAL
PATHOLOGY STUDY: NORMAL
PCP: NEGATIVE
PHENTERMINE: NOT DETECTED
PREGABALIN: NOT DETECTED
TAPENTADOL, URINE: NOT DETECTED
TAPENTADOL-O-SULFATE, URINE: NOT DETECTED
TEMAZEPAM: NOT DETECTED
TRAMADOL: NEGATIVE
ZOLPIDEM: NOT DETECTED

## 2024-06-04 ENCOUNTER — HOSPITAL ENCOUNTER (OUTPATIENT)
Dept: ULTRASOUND IMAGING | Age: 24
Discharge: HOME | End: 2024-06-06
Attending: OBSTETRICS & GYNECOLOGY
Payer: MEDICAID

## 2024-06-04 DIAGNOSIS — Z34.00 ENCOUNTER FOR SUPERVISION PREGNANCY IN PRIMIGRAVIDA, ANTEPARTUM: ICD-10-CM

## 2024-06-04 DIAGNOSIS — Z3A.34 34 WEEKS GESTATION OF PREGNANCY: ICD-10-CM

## 2024-06-04 PROCEDURE — 76815 OB US LIMITED FETUS(S): CPT

## 2024-06-06 ENCOUNTER — ROUTINE PRENATAL (OUTPATIENT)
Dept: OBGYN CLINIC | Age: 24
End: 2024-06-06
Payer: MEDICAID

## 2024-06-06 ENCOUNTER — HOSPITAL ENCOUNTER (OUTPATIENT)
Age: 24
Setting detail: SPECIMEN
Discharge: HOME | End: 2024-06-06
Payer: MEDICAID

## 2024-06-06 VITALS
WEIGHT: 190 LBS | HEART RATE: 92 BPM | BODY MASS INDEX: 33.66 KG/M2 | SYSTOLIC BLOOD PRESSURE: 110 MMHG | DIASTOLIC BLOOD PRESSURE: 62 MMHG

## 2024-06-06 DIAGNOSIS — Z3A.35 35 WEEKS GESTATION OF PREGNANCY: ICD-10-CM

## 2024-06-06 DIAGNOSIS — O09.33 INSUFFICIENT PRENATAL CARE IN THIRD TRIMESTER: ICD-10-CM

## 2024-06-06 DIAGNOSIS — Z3A.35 35 WEEKS GESTATION OF PREGNANCY: Primary | ICD-10-CM

## 2024-06-06 PROCEDURE — 99213 OFFICE O/P EST LOW 20 MIN: CPT | Performed by: OBSTETRICS & GYNECOLOGY

## 2024-06-06 PROCEDURE — 87081 CULTURE SCREEN ONLY: CPT

## 2024-06-06 PROCEDURE — 90715 TDAP VACCINE 7 YRS/> IM: CPT | Performed by: OBSTETRICS & GYNECOLOGY

## 2024-06-06 PROCEDURE — 90471 IMMUNIZATION ADMIN: CPT | Performed by: OBSTETRICS & GYNECOLOGY

## 2024-06-06 NOTE — PATIENT INSTRUCTIONS
information.         [place] [employer]*  (Call [employer]/211 if need more resources.)        SNAP:  What they offer:  Helps low-income adults and families stretch their monthly food budgets and buy healthy food  Phone Number: 256.641.4420  Website: http://www.[employer]x/financial-support-services/food-assistance    Meals on wheels- [facility] office on aging:  What they offer: Home delivered meals, restaurant voucher program, senior food box program and emergency food pantry.   Phone Number:  602.496.6031  Website: https://Heyo/cherry-Getbazza/nutrition    [facility] [employer] bank:  What they offer: Will provide a list of available food pantries in the area weekly.   Phone Number: 999.432.1949  Website: https://www.Floor[employer]/                  [employer] [place] [address] [place]  What they offer: Meals-on-Wheels for those over 60  Phone Number:  146.567.9745  Website: https://[employer] TechnologyDay[facility] CenterTrueFacet.org/                      [employer]  What they offer: put in zip code and gives upcoming food distributions in area  Website: https://[facility]/

## 2024-06-06 NOTE — PROGRESS NOTES
Patient's last menstrual period was 10/11/2023 (approximate).  Estimated Date of Delivery: 7/5/24   Please reference prenatal and OB flow chart for further information  PT here today for routine prenatal care  Pt endorses fetal movement and denies loss of fluid, contractions or vaginal bleeding  Pt without complaints  ROS:  Pt denies headache, dysuria, nausea/vomiting  PT denies chest pain or SOB  PE:  /62   Pulse 92   Wt 86.2 kg (190 lb)   LMP 10/11/2023 (Approximate) Comment: Per pt she is 28 weeks pregnant  BMI 33.66 kg/m²   Gen - Alert and oriented x 3  HEENT- NC/AT, CVS - RRR, Lungs - CTAB  Abd - FH Appropriate fetal growth  LE no edema  Reassuring fetal status at this time     Diagnosis Orders   1. 35 weeks gestation of pregnancy  Culture, Strep B Screen, Vaginal/Rectal      2. Insufficient prenatal care in third trimester  Culture, Strep B Screen, Vaginal/Rectal          Upon completion of the visit all questions were answered and the patients follow-up and testing schedule were reviewed.  Pt recommended to continue pnvit and iron if ordered along with other supporting meds  Spent 20-25 min total time with pt

## 2024-06-08 LAB — GP B STREP SPEC QL CULT: NORMAL

## 2024-06-10 LAB — GP B STREP SPEC QL CULT: NORMAL

## 2024-06-13 ENCOUNTER — ROUTINE PRENATAL (OUTPATIENT)
Dept: OBGYN CLINIC | Age: 24
End: 2024-06-13
Payer: MEDICAID

## 2024-06-13 ENCOUNTER — HOSPITAL ENCOUNTER (OUTPATIENT)
Age: 24
Setting detail: SPECIMEN
Discharge: HOME | End: 2024-06-13
Payer: MEDICAID

## 2024-06-13 VITALS
HEIGHT: 63 IN | SYSTOLIC BLOOD PRESSURE: 110 MMHG | BODY MASS INDEX: 34.91 KG/M2 | DIASTOLIC BLOOD PRESSURE: 72 MMHG | WEIGHT: 197 LBS

## 2024-06-13 DIAGNOSIS — N89.8 VAGINAL DISCHARGE: ICD-10-CM

## 2024-06-13 DIAGNOSIS — Z34.00 ENCOUNTER FOR SUPERVISION PREGNANCY IN PRIMIGRAVIDA, ANTEPARTUM: ICD-10-CM

## 2024-06-13 DIAGNOSIS — Z3A.37 37 WEEKS GESTATION OF PREGNANCY: Primary | ICD-10-CM

## 2024-06-13 PROCEDURE — 87808 TRICHOMONAS ASSAY W/OPTIC: CPT

## 2024-06-13 PROCEDURE — 99213 OFFICE O/P EST LOW 20 MIN: CPT | Performed by: OBSTETRICS & GYNECOLOGY

## 2024-06-13 PROCEDURE — 87210 SMEAR WET MOUNT SALINE/INK: CPT

## 2024-06-14 LAB
CLUE CELLS VAG QL WET PREP: NORMAL
T VAGINALIS VAG QL WET PREP: NORMAL
TRICHOMONAS VAGINALIS SCREEN: NEGATIVE
YEAST VAG QL WET PREP: NORMAL

## 2024-06-14 NOTE — PROGRESS NOTES
Patient's last menstrual period was 10/11/2023 (approximate).  Estimated Date of Delivery: 7/3/24   Please reference prenatal and OB flow chart for further information  PT here today for routine prenatal care  Pt endorses fetal movement and denies loss of fluid, contractions or vaginal bleeding  Pt without complaints  ROS:  Pt denies headache, dysuria, nausea/vomiting  PT denies chest pain or SOB  PE:  /72   Ht 1.6 m (5' 3\")   Wt 89.4 kg (197 lb)   LMP 10/11/2023 (Approximate) Comment: Per pt she is 28 weeks pregnant  BMI 34.90 kg/m²   Gen - Alert and oriented x 3  HEENT- NC/AT, CVS - RRR, Lungs - CTAB  Abd - FH Appropriate fetal growth  LE no edema  Reassuring fetal status at this time     Diagnosis Orders   1. 37 weeks gestation of pregnancy        2. Vaginal discharge  Wet Prep, Genital      3. Encounter for supervision pregnancy in primigravida, antepartum            Upon completion of the visit all questions were answered and the patients follow-up and testing schedule were reviewed.  Pt recommended to continue pnvit and iron if ordered along with other supporting meds  Spent 20-25 min total time with pt     Patient may be relocating to [place] discussed with patient options for induction if she does go beyond her due date however before then to report to the local labor and delivery unit at either [facility]

## 2024-10-10 ENCOUNTER — APPOINTMENT (OUTPATIENT)
Dept: GENERAL RADIOLOGY | Age: 24
End: 2024-10-10
Payer: MEDICAID

## 2024-10-10 ENCOUNTER — HOSPITAL ENCOUNTER (EMERGENCY)
Age: 24
Discharge: HOME | End: 2024-10-10
Attending: EMERGENCY MEDICINE
Payer: MEDICAID

## 2024-10-10 VITALS
OXYGEN SATURATION: 97 % | HEART RATE: 87 BPM | HEIGHT: 64 IN | TEMPERATURE: 98.5 F | WEIGHT: 180 LBS | BODY MASS INDEX: 30.73 KG/M2 | SYSTOLIC BLOOD PRESSURE: 128 MMHG | DIASTOLIC BLOOD PRESSURE: 72 MMHG | RESPIRATION RATE: 16 BRPM

## 2024-10-10 DIAGNOSIS — S93.401A SPRAIN OF RIGHT ANKLE, UNSPECIFIED LIGAMENT, INITIAL ENCOUNTER: Primary | ICD-10-CM

## 2024-10-10 PROCEDURE — 99283 EMERGENCY DEPT VISIT LOW MDM: CPT

## 2024-10-10 PROCEDURE — 73610 X-RAY EXAM OF ANKLE: CPT

## 2024-10-10 ASSESSMENT — PAIN DESCRIPTION - ORIENTATION: ORIENTATION: RIGHT

## 2024-10-10 ASSESSMENT — PAIN SCALES - GENERAL: PAINLEVEL_OUTOF10: 7

## 2024-10-10 ASSESSMENT — PAIN DESCRIPTION - LOCATION: LOCATION: FOOT

## 2024-10-10 ASSESSMENT — PAIN DESCRIPTION - DESCRIPTORS: DESCRIPTORS: SHARP;THROBBING

## 2024-10-10 ASSESSMENT — PAIN - FUNCTIONAL ASSESSMENT: PAIN_FUNCTIONAL_ASSESSMENT: 0-10

## 2024-10-11 NOTE — ED PROVIDER NOTES
9:37 PM EDT  [facility] ED  EMERGENCY DEPARTMENT ENCOUNTER      Pt Name: Wendi Gomez  MRN: [id number]  Birthdate 2000  Date of evaluation: 10/10/2024  Provider: Alvin Jaquez MD    CHIEF COMPLAINT       Chief Complaint   Patient presents with    Foot Injury     Pt fell down concrete steps injuring her right foot and ankle.         HISTORY OF PRESENT ILLNESS   (Location/Symptom, Timing/Onset, Context/Setting, Quality, Duration, Modifying Factors, Severity)  Note limiting factors.   23-year-old female presenting with right ankle pain.  Patient tripped down some stairs and has pain localized to the right ankle.  She is able to walk on it still.  Has not taken anything for pain prior to arrival.        Nursing Notes were reviewed.    REVIEW OF SYSTEMS    (2-9 systems for level 4, 10 or more for level 5)     Review of Systems   All other systems reviewed and are negative.      Except as noted above the remainder of the review of systems was reviewed and negative.       PAST MEDICAL HISTORY     Past Medical History:   Diagnosis Date    Asthma     Stomach ulcer     UTI (urinary tract infection)          SURGICAL HISTORY     History reviewed. No pertinent surgical history.      CURRENT MEDICATIONS       Current Discharge Medication List        CONTINUE these medications which have NOT CHANGED    Details   fluconazole (DIFLUCAN) 150 MG tablet Take 1 tablet every 2 days for 3 doses.  Qty: 3 tablet, Refills: 0      ondansetron (ZOFRAN-ODT) 8 MG TBDP disintegrating tablet Take 1 tablet by mouth every 8 hours as needed for Nausea or Vomiting      lithium 150 MG capsule Take 1 capsule by mouth 2 times daily (with meals)  Qty: 30 capsule, Refills: 3      QUEtiapine (SEROQUEL XR) 50 MG extended release tablet Take 1 tablet by mouth nightly  Qty: 15 tablet, Refills: 2      Prenatal Vit-Fe Fumarate-FA (PRENATAL VITAMIN) 27-0.8 MG TABS Take 1 tablet by mouth daily  Qty: 30 tablet, Refills: 0             ALLERGIES

## 2024-10-29 ENCOUNTER — HOSPITAL ENCOUNTER (EMERGENCY)
Age: 24
Discharge: HOME | End: 2024-10-29
Attending: EMERGENCY MEDICINE
Payer: MEDICAID

## 2024-10-29 VITALS
BODY MASS INDEX: 22.2 KG/M2 | WEIGHT: 130 LBS | SYSTOLIC BLOOD PRESSURE: 143 MMHG | DIASTOLIC BLOOD PRESSURE: 67 MMHG | TEMPERATURE: 98.1 F | HEART RATE: 85 BPM | HEIGHT: 64 IN | OXYGEN SATURATION: 97 % | RESPIRATION RATE: 18 BRPM

## 2024-10-29 DIAGNOSIS — U07.1 COVID-19: Primary | ICD-10-CM

## 2024-10-29 LAB — SARS-COV-2 RDRP RESP QL NAA+PROBE: DETECTED

## 2024-10-29 PROCEDURE — 94640 AIRWAY INHALATION TREATMENT: CPT

## 2024-10-29 PROCEDURE — 87635 SARS-COV-2 COVID-19 AMP PRB: CPT

## 2024-10-29 PROCEDURE — 6370000000 HC RX 637 (ALT 250 FOR IP): Performed by: EMERGENCY MEDICINE

## 2024-10-29 PROCEDURE — 99283 EMERGENCY DEPT VISIT LOW MDM: CPT

## 2024-10-29 PROCEDURE — 94664 DEMO&/EVAL PT USE INHALER: CPT

## 2024-10-29 RX ORDER — PREDNISONE 20 MG/1
40 TABLET ORAL DAILY
Qty: 6 TABLET | Refills: 0 | Status: SHIPPED | OUTPATIENT
Start: 2024-10-29 | End: 2024-11-01

## 2024-10-29 RX ORDER — PREDNISONE 20 MG/1
40 TABLET ORAL ONCE
Status: COMPLETED | OUTPATIENT
Start: 2024-10-29 | End: 2024-10-29

## 2024-10-29 RX ORDER — ALBUTEROL SULFATE 90 UG/1
2 INHALANT RESPIRATORY (INHALATION) ONCE
Status: COMPLETED | OUTPATIENT
Start: 2024-10-29 | End: 2024-10-29

## 2024-10-29 RX ADMIN — PREDNISONE 40 MG: 20 TABLET ORAL at 16:28

## 2024-10-29 RX ADMIN — ALBUTEROL SULFATE 2 PUFF: 108 AEROSOL, METERED RESPIRATORY (INHALATION) at 16:29

## 2024-10-29 ASSESSMENT — ENCOUNTER SYMPTOMS
VOMITING: 0
NAUSEA: 1
BACK PAIN: 0
COUGH: 1
SHORTNESS OF BREATH: 1
SINUS PAIN: 0
EYE DISCHARGE: 0
DIARRHEA: 0
SORE THROAT: 1
EYE REDNESS: 0
ABDOMINAL PAIN: 0
COLOR CHANGE: 0

## 2024-10-29 ASSESSMENT — PAIN DESCRIPTION - LOCATION: LOCATION: CHEST

## 2024-10-29 ASSESSMENT — PAIN SCALES - GENERAL: PAINLEVEL_OUTOF10: 5

## 2024-10-29 ASSESSMENT — PAIN DESCRIPTION - DESCRIPTORS: DESCRIPTORS: PRESSURE

## 2024-10-29 ASSESSMENT — PAIN DESCRIPTION - PAIN TYPE: TYPE: ACUTE PAIN

## 2024-10-29 ASSESSMENT — PAIN - FUNCTIONAL ASSESSMENT: PAIN_FUNCTIONAL_ASSESSMENT: 0-10

## 2024-10-29 ASSESSMENT — PAIN DESCRIPTION - FREQUENCY: FREQUENCY: CONTINUOUS

## 2024-10-29 NOTE — ED TRIAGE NOTES
Patient states cough and congestion began on Saturday. Patient states ears fee like there is pressure inside. Patient c/o of ringing in ears. Patient c/o of sore throat.

## 2024-10-29 NOTE — ED NOTES
Pt is given d/c instructions and one script. Pt voiced understanding of d/c instructions without further questions.

## 2024-10-29 NOTE — ED PROVIDER NOTES
alcohol usage    Drug use: Yes     Types: Marijuana (Weed)    Sexual activity: Yes     Partners: Male       PHYSICAL EXAM       ED Triage Vitals [10/29/24 1513]   BP Systolic BP Percentile Diastolic BP Percentile Temp Temp Source Pulse Respirations SpO2   (!) 143/67 -- -- 98.1 °F (36.7 °C) Oral 85 18 97 %      Height Weight - Scale         1.626 m (5' 4\") 59 kg (130 lb)             Physical Exam  General appearance: Patient is awake alert interactive appropriate nontoxic in no acute distress  Head is atraumatic normocephalic  Eyes pupils are equal and reactive sclera white conjunctive are pink  Oral pharyngeal cavity is pink with good moisture, mild posterior erythema, no exudates or ulcerations no asymmetry, the airway is widely patent  Nose: Moderate nasal congestion no gross rhinorrhea or purulent  Neck: Supple no meningeal signs no adenopathy no JVD  Heart: Regular rate and rhythm no gross murmurs rubs or clicks  Lungs: Breath sounds are coarse but clear, no active wheezes rales or rhonchi no respiratory distress.  No use of accessory muscles or conversational dyspnea, intermittent rhonchorous cough noted during examination pulse ox 97% on room air  Abdomen: Soft nontender with good bowel sounds no rebound rigidity or guarding no firm or pulsatile masses, Back: Nontender to palpation no costovertebral angle tenderness  Skin: Warm and dry without rashes  Lower extremities: No edema or calf tenderness or asymmetry.    DIAGNOSTIC RESULTS     LABS:  Labs Reviewed   COVID-19, RAPID - Abnormal; Notable for the following components:       Result Value    SARS-CoV-2, NAAT DETECTED (*)     All other components within normal limits       All other labs were within normal range or not returned as of this dictation.    EMERGENCY DEPARTMENT COURSE and DIFFERENTIAL DIAGNOSIS/MDM:   Vitals:    Vitals:    10/29/24 1513   BP: (!) 143/67   Pulse: 85   Resp: 18   Temp: 98.1 °F (36.7 °C)   TempSrc: Oral   SpO2: 97%   Weight: 59 kg

## 2024-11-11 NOTE — ED NOTES
"- See plan for \"anxiety\"    Orders:    [facility] referral [facility]; Future    " Call received from Dr. Hoffman. Reviewed pt with him and received order to admit to [facility]. Call placed to [facility] and notified them that we will be accepting pt here. Pt updated on plan of care.